# Patient Record
Sex: FEMALE | HISPANIC OR LATINO | Employment: FULL TIME | URBAN - METROPOLITAN AREA
[De-identification: names, ages, dates, MRNs, and addresses within clinical notes are randomized per-mention and may not be internally consistent; named-entity substitution may affect disease eponyms.]

---

## 2021-10-26 ENCOUNTER — LAB REQUISITION (OUTPATIENT)
Dept: LAB | Facility: CLINIC | Age: 26
End: 2021-10-26
Payer: COMMERCIAL

## 2021-10-26 DIAGNOSIS — L08.9 LOCAL INFECTION OF THE SKIN AND SUBCUTANEOUS TISSUE, UNSPECIFIED: ICD-10-CM

## 2021-10-26 PROCEDURE — 87077 CULTURE AEROBIC IDENTIFY: CPT | Mod: ORL | Performed by: DERMATOLOGY

## 2021-10-30 LAB — BACTERIA SPEC CULT: ABNORMAL

## 2024-02-19 ENCOUNTER — VIRTUAL VISIT (OUTPATIENT)
Dept: FAMILY MEDICINE | Facility: CLINIC | Age: 29
End: 2024-02-19
Payer: COMMERCIAL

## 2024-02-19 DIAGNOSIS — E55.9 VITAMIN D DEFICIENCY: ICD-10-CM

## 2024-02-19 DIAGNOSIS — R42 DIZZINESS: Primary | ICD-10-CM

## 2024-02-19 DIAGNOSIS — R53.83 OTHER FATIGUE: ICD-10-CM

## 2024-02-19 PROCEDURE — 99203 OFFICE O/P NEW LOW 30 MIN: CPT | Mod: 95 | Performed by: INTERNAL MEDICINE

## 2024-02-19 NOTE — PROGRESS NOTES
Eleanor is a 28 year old who is being evaluated via a billable video visit.      How would you like to obtain your AVS? MyChart  If the video visit is dropped, the invitation should be resent by: Text to cell phone: [phone]  Will anyone else be joining your video visit? No      Assessment & Plan     Dizziness  Will check labs to rule out iron def, anemia, electrolytes, kidney and liver function.  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   Feeling tired after workout - more winded with HIT.    Faint after sports as a child  Blood work done in [place] which was normal.     No medical conditions except skin condition - acne/rash   Fam hx: neg for B/O/C cancer, unknown for CAD and VTE  DM2 in paternal uncle.  HLD in father   Nonsmoker   ETOH: 1 drink per week.    UTD with pap      History of Present Illness       Hypothyroidism:     Since last visit, patient describes the following symptoms::  Anxiety, Fatigue and Hair loss    Reason for visit:  I get really tired whenever I do high intensity exercise or am physical at all. I get esaily winded and dizzy. I have a history of issues with my blood sugar and thyroid issues run in my family.    She eats 0-1 servings of fruits and vegetables daily.She consumes 0 sweetened beverage(s) daily.She exercises with enough effort to increase her heart rate 9 or less minutes per day.  She exercises with enough effort to increase her heart rate 3 or less  days per week.   She is taking medications regularly.         Objective           Vitals:  No vitals were obtained today due to virtual visit.    Physical Exam   GEN: No acute distress  RESP: No audible increased work of breathing. Patient speaking in full sentences without distress.  PSYCH: pleasant  Exam otherwise limited due to virtual platform        Video-Visit Details    Type of service:  Video Visit     Originating Location (pt. Location): Home  Distant Location (provider location):  On-site  Platform used for Video Visit: Dontae  Signed Electronically by: Bernie Torres MD

## 2024-02-21 ENCOUNTER — MYC MEDICAL ADVICE (OUTPATIENT)
Dept: FAMILY MEDICINE | Facility: CLINIC | Age: 29
End: 2024-02-21
Payer: COMMERCIAL

## 2024-02-21 DIAGNOSIS — Z00.00 ANNUAL PHYSICAL EXAM: Primary | ICD-10-CM

## 2024-02-22 PROBLEM — R42 DIZZINESS: Status: ACTIVE | Noted: 2024-02-22

## 2024-02-22 PROBLEM — E55.9 VITAMIN D DEFICIENCY: Status: ACTIVE | Noted: 2024-02-22

## 2024-02-22 PROBLEM — R53.83 OTHER FATIGUE: Status: ACTIVE | Noted: 2024-02-22

## 2024-02-24 ENCOUNTER — LAB (OUTPATIENT)
Dept: LAB | Facility: CLINIC | Age: 29
End: 2024-02-24
Payer: COMMERCIAL

## 2024-02-24 DIAGNOSIS — Z00.00 ANNUAL PHYSICAL EXAM: ICD-10-CM

## 2024-02-24 DIAGNOSIS — E55.9 VITAMIN D DEFICIENCY: ICD-10-CM

## 2024-02-24 DIAGNOSIS — R42 DIZZINESS: ICD-10-CM

## 2024-02-24 DIAGNOSIS — R53.83 OTHER FATIGUE: ICD-10-CM

## 2024-02-24 LAB
ALBUMIN SERPL BCG-MCNC: 4.3 G/DL (ref 3.5–5.2)
ALP SERPL-CCNC: 63 U/L (ref 40–150)
ALT SERPL W P-5'-P-CCNC: 8 U/L (ref 0–50)
ANION GAP SERPL CALCULATED.3IONS-SCNC: 9 MMOL/L (ref 7–15)
AST SERPL W P-5'-P-CCNC: 18 U/L (ref 0–45)
BASOPHILS # BLD AUTO: 0 10E3/UL (ref 0–0.2)
BASOPHILS NFR BLD AUTO: 0 %
BILIRUB SERPL-MCNC: 0.2 MG/DL
BUN SERPL-MCNC: 10.2 MG/DL (ref 6–20)
CALCIUM SERPL-MCNC: 9.4 MG/DL (ref 8.6–10)
CHLORIDE SERPL-SCNC: 105 MMOL/L (ref 98–107)
CHOLEST SERPL-MCNC: 175 MG/DL
CREAT SERPL-MCNC: 0.77 MG/DL (ref 0.51–0.95)
DEPRECATED HCO3 PLAS-SCNC: 24 MMOL/L (ref 22–29)
EGFRCR SERPLBLD CKD-EPI 2021: >90 ML/MIN/1.73M2
EOSINOPHIL # BLD AUTO: 0 10E3/UL (ref 0–0.7)
EOSINOPHIL NFR BLD AUTO: 0 %
ERYTHROCYTE [DISTWIDTH] IN BLOOD BY AUTOMATED COUNT: 15.4 % (ref 10–15)
FASTING STATUS PATIENT QL REPORTED: YES
FERRITIN SERPL-MCNC: 9 NG/ML (ref 6–175)
GLUCOSE SERPL-MCNC: 89 MG/DL (ref 70–99)
HCT VFR BLD AUTO: 34.2 % (ref 35–47)
HDLC SERPL-MCNC: 64 MG/DL
HGB BLD-MCNC: 10.1 G/DL (ref 11.7–15.7)
IMM GRANULOCYTES # BLD: 0 10E3/UL
IMM GRANULOCYTES NFR BLD: 0 %
IRON BINDING CAPACITY (ROCHE): 352 UG/DL (ref 240–430)
IRON SATN MFR SERPL: 7 % (ref 15–46)
IRON SERPL-MCNC: 23 UG/DL (ref 37–145)
LDLC SERPL CALC-MCNC: 102 MG/DL
LYMPHOCYTES # BLD AUTO: 1.8 10E3/UL (ref 0.8–5.3)
LYMPHOCYTES NFR BLD AUTO: 34 %
MCH RBC QN AUTO: 23.5 PG (ref 26.5–33)
MCHC RBC AUTO-ENTMCNC: 29.5 G/DL (ref 31.5–36.5)
MCV RBC AUTO: 80 FL (ref 78–100)
MONOCYTES # BLD AUTO: 0.4 10E3/UL (ref 0–1.3)
MONOCYTES NFR BLD AUTO: 8 %
NEUTROPHILS # BLD AUTO: 3.1 10E3/UL (ref 1.6–8.3)
NEUTROPHILS NFR BLD AUTO: 57 %
NONHDLC SERPL-MCNC: 111 MG/DL
PLATELET # BLD AUTO: 329 10E3/UL (ref 150–450)
POTASSIUM SERPL-SCNC: 4.7 MMOL/L (ref 3.4–5.3)
PROT SERPL-MCNC: 7.4 G/DL (ref 6.4–8.3)
RBC # BLD AUTO: 4.29 10E6/UL (ref 3.8–5.2)
SODIUM SERPL-SCNC: 138 MMOL/L (ref 135–145)
TRIGL SERPL-MCNC: 46 MG/DL
TSH SERPL DL<=0.005 MIU/L-ACNC: 1.36 UIU/ML (ref 0.3–4.2)
VIT D+METAB SERPL-MCNC: 14 NG/ML (ref 20–50)
WBC # BLD AUTO: 5.3 10E3/UL (ref 4–11)

## 2024-02-24 PROCEDURE — 83550 IRON BINDING TEST: CPT

## 2024-02-24 PROCEDURE — 84443 ASSAY THYROID STIM HORMONE: CPT

## 2024-02-24 PROCEDURE — 82728 ASSAY OF FERRITIN: CPT

## 2024-02-24 PROCEDURE — 85025 COMPLETE CBC W/AUTO DIFF WBC: CPT

## 2024-02-24 PROCEDURE — 80053 COMPREHEN METABOLIC PANEL: CPT

## 2024-02-24 PROCEDURE — 36415 COLL VENOUS BLD VENIPUNCTURE: CPT

## 2024-02-24 PROCEDURE — 80061 LIPID PANEL: CPT

## 2024-02-24 PROCEDURE — 82306 VITAMIN D 25 HYDROXY: CPT

## 2024-02-24 PROCEDURE — 83540 ASSAY OF IRON: CPT

## 2024-02-27 ENCOUNTER — MYC MEDICAL ADVICE (OUTPATIENT)
Dept: FAMILY MEDICINE | Facility: CLINIC | Age: 29
End: 2024-02-27
Payer: COMMERCIAL

## 2024-02-27 DIAGNOSIS — D50.8 IRON DEFICIENCY ANEMIA SECONDARY TO INADEQUATE DIETARY IRON INTAKE: ICD-10-CM

## 2024-02-27 DIAGNOSIS — E55.9 VITAMIN D DEFICIENCY: Primary | ICD-10-CM

## 2024-02-27 RX ORDER — HEPARIN SODIUM,PORCINE 10 UNIT/ML
5-20 VIAL (ML) INTRAVENOUS DAILY PRN
OUTPATIENT
Start: 2024-03-05

## 2024-02-27 RX ORDER — ALBUTEROL SULFATE 90 UG/1
1-2 AEROSOL, METERED RESPIRATORY (INHALATION)
Start: 2024-03-05

## 2024-02-27 RX ORDER — HEPARIN SODIUM (PORCINE) LOCK FLUSH IV SOLN 100 UNIT/ML 100 UNIT/ML
5 SOLUTION INTRAVENOUS
OUTPATIENT
Start: 2024-03-05

## 2024-02-27 RX ORDER — ALBUTEROL SULFATE 0.83 MG/ML
2.5 SOLUTION RESPIRATORY (INHALATION)
OUTPATIENT
Start: 2024-03-05

## 2024-02-27 RX ORDER — DIPHENHYDRAMINE HYDROCHLORIDE 50 MG/ML
50 INJECTION INTRAMUSCULAR; INTRAVENOUS
Start: 2024-03-05

## 2024-02-27 RX ORDER — EPINEPHRINE 1 MG/ML
0.3 INJECTION, SOLUTION, CONCENTRATE INTRAVENOUS EVERY 5 MIN PRN
OUTPATIENT
Start: 2024-03-05

## 2024-02-27 RX ORDER — METHYLPREDNISOLONE SODIUM SUCCINATE 125 MG/2ML
125 INJECTION, POWDER, LYOPHILIZED, FOR SOLUTION INTRAMUSCULAR; INTRAVENOUS
Start: 2024-03-05

## 2024-02-27 RX ORDER — MEPERIDINE HYDROCHLORIDE 25 MG/ML
25 INJECTION INTRAMUSCULAR; INTRAVENOUS; SUBCUTANEOUS EVERY 30 MIN PRN
OUTPATIENT
Start: 2024-03-05

## 2024-02-28 NOTE — TELEPHONE ENCOUNTER
Patient Contact    [facility] message unread     Was call answered? No unable to dial out on pt's number - error message call could not be completed     Kiersten MORALES, Triage RN  [facility]

## 2024-03-01 NOTE — TELEPHONE ENCOUNTER
PCP:     Unable to reach patient - number listed on file for patient is not a working number    No alternative number for patient     No C2C on file for patient     Pt did not view [employer] message     Please advise     Kiersten MORALES Triage RN  [facility]

## 2024-03-06 NOTE — TELEPHONE ENCOUNTER
Patient agrees to vitamin D supplementation.  Please review pended order to ensure it's appropriate

## 2024-03-09 ENCOUNTER — HEALTH MAINTENANCE LETTER (OUTPATIENT)
Age: 29
End: 2024-03-09

## 2024-03-12 NOTE — TELEPHONE ENCOUNTER
Request forwarded over to Infusion team to assist with prior authorization.    Dani Valadez, YISEL on 3/12/2024 at 9:12 AM

## 2024-03-19 ENCOUNTER — VIRTUAL VISIT (OUTPATIENT)
Dept: FAMILY MEDICINE | Facility: CLINIC | Age: 29
End: 2024-03-19
Payer: COMMERCIAL

## 2024-03-19 DIAGNOSIS — D50.8 IRON DEFICIENCY ANEMIA SECONDARY TO INADEQUATE DIETARY IRON INTAKE: Primary | ICD-10-CM

## 2024-03-19 PROCEDURE — 99213 OFFICE O/P EST LOW 20 MIN: CPT | Mod: 95 | Performed by: INTERNAL MEDICINE

## 2024-03-19 RX ORDER — FERROUS SULFATE 325(65) MG
325 TABLET ORAL
Qty: 90 TABLET | Refills: 0 | Status: SHIPPED | OUTPATIENT
Start: 2024-03-19 | End: 2024-06-17

## 2024-03-19 NOTE — PROGRESS NOTES
Eleanor is a 28 year old who is being evaluated via a billable video visit.    How would you like to obtain your AVS? MyChart  If the video visit is dropped, the invitation should be resent by: Text to cell phone: [phone]  Will anyone else be joining your video visit? No    Assessment & Plan     Iron deficiency anemia secondary to inadequate dietary iron intake  Iron sulfate daily with vitamin C   Will check hgb, ferritin and iron in 2 months.  - Ferritin; Future  - ferrous sulfate (FEROSUL) 325 (65 Fe) MG tablet; Take 1 tablet (325 mg) by mouth daily (with breakfast)  - Hemoglobin; Future  - Iron and iron binding capacity; Future        See Patient Instructions    Subjective   Eleanor is a 28 year old, presenting for the following health issues:  Recheck Medication    HPI   Eleanor is here virtually to discuss iron deficiency.   Questions about staining - explained.  Wants to do through diet and iron supplement           Objective       Vitals:  No vitals were obtained today due to virtual visit.    Physical Exam   GEN: No acute distress  RESP: No audible increased work of breathing. Patient speaking in full sentences without distress.  PSYCH: pleasant  Exam otherwise limited due to virtual platform        Video-Visit Details    Type of service:  Video Visit   Originating Location (pt. Location): Home  Distant Location (provider location):  On-site  Platform used for Video Visit: Dontae  Signed Electronically by: Bernie Torres MD

## 2024-03-19 NOTE — PROGRESS NOTES
"Eleanor is a 28 year old who is being evaluated via a billable video visit.    How would you like to obtain your AVS? MyChart  If the video visit is dropped, the invitation should be resent by: Send to e-mail at: [email or web address]  Will anyone else be joining your video visit? No  {If patient encounters technical issues they should call [phone] :140639}    {PROVIDER CHARTING PREFERENCE:294812}    Subjective   Eleanor is a 28 year old, presenting for the following health issues:  Recheck Medication    History of Present Illness       Reason for visit:  Staph Flare Up & Questions  Symptom onset:  3-4 weeks ago        {SUPERLIST (Optional):526737}  {additonal problems for provider to add (Optional):700698}    {ROS Picklists (Optional):544059}      Objective           Vitals:  No vitals were obtained today due to virtual visit.    Physical Exam   {video visit exam brief selected:134516}    {Diagnostic Test Results (Optional):466798}      Video-Visit Details    Type of service:  Video Visit   Originating Location (pt. Location): {video visit patient location:759784::\"Home\"}  {PROVIDER LOCATION On-site should be selected for visits conducted from your clinic location or adjoining [facility] hospital, academic office, or other nearby [facility] building. Off-site should be selected for all other provider locations, including home:332583}  Distant Location (provider location):  {virtual location provider:783586}  Platform used for Video Visit: {Virtual Visit Platforms:390070::\"[employer]\"}  Signed Electronically by: Bernie Torres MD  {Email feedback regarding this note to [email or web address]   :631509}  "

## 2024-03-19 NOTE — PROGRESS NOTES
"Eleanor is a 28 year old who is being evaluated via a billable video visit.    {ROOMING STAFF complete during rooming of virtual visit (Optional):459549}  {If patient encounters technical issues they should call [phone] :865361}    {PROVIDER CHARTING PREFERENCE:612239}    Subjective   Eleanor is a 28 year old, presenting for the following health issues:  Recheck Medication  {(!) Visit Details have not yet been documented.  Please enter Visit Details and then use this list to pull in documentation. (Optional):812730}  HPI     {SUPERLIST (Optional):369011}  {additonal problems for provider to add (Optional):023872}    {ROS Picklists (Optional):499611}      Objective           Vitals:  No vitals were obtained today due to virtual visit.    Physical Exam   {video visit exam brief selected:357396}    {Diagnostic Test Results (Optional):346638}      Video-Visit Details    Type of service:  Video Visit   Originating Location (pt. Location): {video visit patient location:835495::\"Home\"}  {PROVIDER LOCATION On-site should be selected for visits conducted from your clinic location or adjoining [facility] hospital, academic office, or other nearby [facility] building. Off-site should be selected for all other provider locations, including home:300895}  Distant Location (provider location):  {virtual location provider:183544}  Platform used for Video Visit: {Virtual Visit Platforms:746531::\"[employer]\"}  Signed Electronically by: Bernie Torres MD  {Email feedback regarding this note to [email or web address]   :606639}  "

## 2024-05-01 ENCOUNTER — TRANSFERRED RECORDS (OUTPATIENT)
Dept: MULTI SPECIALTY CLINIC | Facility: CLINIC | Age: 29
End: 2024-05-01

## 2024-05-01 LAB — PAP SMEAR - HIM PATIENT REPORTED: NORMAL

## 2024-05-21 DIAGNOSIS — E55.9 VITAMIN D DEFICIENCY: ICD-10-CM

## 2024-05-21 RX ORDER — CHOLECALCIFEROL (VITAMIN D3) 1250 MCG
1250 CAPSULE ORAL
Qty: 12 CAPSULE | Refills: 0 | OUTPATIENT
Start: 2024-05-21

## 2024-05-22 NOTE — TELEPHONE ENCOUNTER
Attempted to contact pt. Number is not going through when called. No c2c on file for spouse.     Sent [facility] message with providers message from below. Will postpone to ensure pt has read the [facility] message.       Patient Contact    Attempt # 1    Was call answered?  No.  Unable to leave message.

## 2024-06-16 DIAGNOSIS — D50.8 IRON DEFICIENCY ANEMIA SECONDARY TO INADEQUATE DIETARY IRON INTAKE: ICD-10-CM

## 2024-06-17 RX ORDER — FERROUS SULFATE 325(65) MG
325 TABLET ORAL
Qty: 90 TABLET | Refills: 0 | Status: SHIPPED | OUTPATIENT
Start: 2024-06-17 | End: 2024-08-21

## 2024-06-17 NOTE — TELEPHONE ENCOUNTER
Prescription approved per [facility].  Fariba Chamberlain RN  [facility]

## 2024-08-21 ENCOUNTER — MYC MEDICAL ADVICE (OUTPATIENT)
Dept: FAMILY MEDICINE | Facility: CLINIC | Age: 29
End: 2024-08-21

## 2024-08-21 ENCOUNTER — VIRTUAL VISIT (OUTPATIENT)
Dept: FAMILY MEDICINE | Facility: CLINIC | Age: 29
End: 2024-08-21
Payer: COMMERCIAL

## 2024-08-21 DIAGNOSIS — F98.8 ATTENTION DEFICIT DISORDER, UNSPECIFIED HYPERACTIVITY PRESENCE: ICD-10-CM

## 2024-08-21 DIAGNOSIS — D50.8 IRON DEFICIENCY ANEMIA SECONDARY TO INADEQUATE DIETARY IRON INTAKE: ICD-10-CM

## 2024-08-21 DIAGNOSIS — Z83.3 FAMILY HISTORY OF DIABETES MELLITUS: ICD-10-CM

## 2024-08-21 DIAGNOSIS — M26.622 ARTHRALGIA OF LEFT TEMPOROMANDIBULAR JOINT: ICD-10-CM

## 2024-08-21 DIAGNOSIS — E55.9 VITAMIN D DEFICIENCY: Primary | ICD-10-CM

## 2024-08-21 PROCEDURE — 99213 OFFICE O/P EST LOW 20 MIN: CPT | Mod: 95 | Performed by: INTERNAL MEDICINE

## 2024-08-21 RX ORDER — SPIRONOLACTONE 100 MG/1
100 TABLET, FILM COATED ORAL DAILY
COMMUNITY

## 2024-08-21 NOTE — PATIENT INSTRUCTIONS
[facility]  [address]  [facility] [address]  Phone: [phone]

## 2024-08-21 NOTE — PROGRESS NOTES
Eleanor is a 28 year old who is being evaluated via a billable video visit.    How would you like to obtain your AVS? MyChart  If the video visit is dropped, the invitation should be resent by: Text to cell phone: [phone] ???   Will anyone else be joining your video visit? No      Assessment & Plan     Vitamin D deficiency  - Vitamin D Deficiency; Future    Family history of diabetes mellitus  - Hemoglobin A1c; Future    Iron deficiency anemia secondary to inadequate dietary iron intake  - Ferritin; Future  - Hemoglobin; Future    Arthralgia of left temporomandibular joint  - Pain Management  Referral; Future    Attention deficit disorder, unspecified hyperactivity presence  - Adult Mental Health  Referral; Future      See Patient Instructions    Subjective   Eleanor is a 28 year old, presenting for the following health issues:  Mouth Problem (TMJ pain ) and Medication Follow-up (Iron )      Via the Health Maintenance questionnaire, the patient has reported the following services have been completed -Cervical Cancer Screening: Rich WHALEY 2024-06-01, this information has been sent to the abstraction team.  History of Present Illness       Reason for visit:  Jaw Pain is becoming unbearable & ADHD meds questions  Symptom onset:  More than a month  Symptoms include:  Daily, persistent and bad  Symptom intensity:  Severe  Symptom progression:  Worsening  Had these symptoms before:  Yes  Has tried/received treatment for these symptoms:  No  What makes it worse:  Using my jaw  What makes it better:  Not yet.. haven't found a solution She is missing 1 dose(s) of medications per week.  She is not taking prescribed medications regularly due to remembering to take.     Eleanor Brandi Hodges is here virtually to discuss concerns.   She has hx of iron deficiency anemia, was treated with iron sulfate oral. Hemoglobin is normal, checked at [facility].  Vit D high dose supp completed.   TMJ pain, she has  been to her dentist, getting a mouthguard.         Objective       Vitals:  No vitals were obtained today due to virtual visit.    Physical Exam   GEN: No acute distress  RESP: No audible increased work of breathing. Patient speaking in full sentences without distress.  PSYCH: pleasant  Exam otherwise limited due to virtual platform        Video-Visit Details    Type of service:  Video Visit   Originating Location (pt. Location): Home  Distant Location (provider location):  On-site  Platform used for Video Visit: [facility]  Signed Electronically by: Bernie Torres MD

## 2025-02-12 ENCOUNTER — VIRTUAL VISIT (OUTPATIENT)
Facility: CLINIC | Age: 30
End: 2025-02-12
Attending: INTERNAL MEDICINE
Payer: COMMERCIAL

## 2025-02-12 DIAGNOSIS — Z13.39 ATTENTION DEFICIT HYPERACTIVITY DISORDER (ADHD) EVALUATION: ICD-10-CM

## 2025-02-12 DIAGNOSIS — F41.1 GENERALIZED ANXIETY DISORDER: Primary | ICD-10-CM

## 2025-02-12 PROCEDURE — 90832 PSYTX W PT 30 MINUTES: CPT | Mod: 95 | Performed by: PSYCHOLOGIST

## 2025-02-12 ASSESSMENT — COLUMBIA-SUICIDE SEVERITY RATING SCALE - C-SSRS
TOTAL  NUMBER OF INTERRUPTED ATTEMPTS LIFETIME: NO
6. HAVE YOU EVER DONE ANYTHING, STARTED TO DO ANYTHING, OR PREPARED TO DO ANYTHING TO END YOUR LIFE?: NO
TOTAL  NUMBER OF ABORTED OR SELF INTERRUPTED ATTEMPTS LIFETIME: NO
2. HAVE YOU ACTUALLY HAD ANY THOUGHTS OF KILLING YOURSELF?: NO
1. HAVE YOU WISHED YOU WERE DEAD OR WISHED YOU COULD GO TO SLEEP AND NOT WAKE UP?: NO
ATTEMPT LIFETIME: NO

## 2025-02-12 ASSESSMENT — PATIENT HEALTH QUESTIONNAIRE - PHQ9
SUM OF ALL RESPONSES TO PHQ QUESTIONS 1-9: 5
5. POOR APPETITE OR OVEREATING: SEVERAL DAYS
10. IF YOU CHECKED OFF ANY PROBLEMS, HOW DIFFICULT HAVE THESE PROBLEMS MADE IT FOR YOU TO DO YOUR WORK, TAKE CARE OF THINGS AT HOME, OR GET ALONG WITH OTHER PEOPLE: SOMEWHAT DIFFICULT
SUM OF ALL RESPONSES TO PHQ QUESTIONS 1-9: 5

## 2025-02-12 ASSESSMENT — ANXIETY QUESTIONNAIRES
2. NOT BEING ABLE TO STOP OR CONTROL WORRYING: SEVERAL DAYS
6. BECOMING EASILY ANNOYED OR IRRITABLE: SEVERAL DAYS
5. BEING SO RESTLESS THAT IT IS HARD TO SIT STILL: NOT AT ALL
GAD7 TOTAL SCORE: 7
7. FEELING AFRAID AS IF SOMETHING AWFUL MIGHT HAPPEN: SEVERAL DAYS
3. WORRYING TOO MUCH ABOUT DIFFERENT THINGS: MORE THAN HALF THE DAYS
GAD7 TOTAL SCORE: 7
1. FEELING NERVOUS, ANXIOUS, OR ON EDGE: SEVERAL DAYS

## 2025-02-12 NOTE — PROGRESS NOTES
[facility]   Mental Health & Addiction Services     Progress Note - Initial Visit    Client Name:  Eleanor Hodges Date: 2025         Service Type: Individual     Visit Start Time: 11:00am  Visit End Time: 11:32am    Visit #: 1    Attendees: Client attended alone    Service Modality:  Video Visit:      Provider verified identity through the following two step process.  Patient provided:  Patient  and Patient address    Telemedicine Visit: The patient's condition can be safely assessed and treated via synchronous audio and visual telemedicine encounter.      Reason for Telemedicine Visit: Services only offered telehealth    Originating Site (Patient Location): Patient's home    Distant Site (Provider Location): Provider Remote Setting- Home Office    Consent:  The patient/guardian has verbally consented to: the potential risks and benefits of telemedicine (video visit) versus in person care; bill my insurance or make self-payment for services provided; and responsibility for payment of non-covered services.     Patient would like the video invitation sent by:  Send to e-mail at: [email or web address]    Mode of Communication:  Video Conference via [employer]    Distant Location (Provider):  Off-site    As the provider I attest to compliance with applicable laws and regulations related to telemedicine.       DATA:  Extended Session (53+ minutes): No  Interactive Complexity: No   Crisis: No     Presenting Concerns/Current Stressors:   Patient presented to session to initiate the ADHD evaluation process.           2025    10:16 AM   PHQ   PHQ-9 Total Score 5    Q9: Thoughts of better off dead/self-harm past 2 weeks Not at all       Patient-reported            2025    10:16 AM   RIANNA-7 SCORE   Total Score 7       ASSESSMENT:  Mental Status Assessment:  Appearance:   Appropriate   Eye Contact:   Good   Psychomotor Behavior: Normal   Attitude:   Cooperative    Orientation:   All  Speech   Rate / Production: Normal/ Responsive   Volume:  Normal   Mood:    Normal  Affect:    Appropriate   Thought Content:  Clear   Thought Form:  Logical   Insight:    Good       Safety Issues and Plan for Safety and Risk Management:   [place] Suicide Severity Rating Scale (Lifetime/Recent)      2/12/2025    11:06 AM   [place] Suicide Severity Rating (Lifetime/Recent)   1. Wish to be Dead (Lifetime) N   2. Non-Specific Active Suicidal Thoughts (Lifetime) N   Actual Attempt (Lifetime) N   Has subject engaged in non-suicidal self-injurious behavior? (Lifetime) N   Interrupted Attempts (Lifetime) N   Aborted or Self-Interrupted Attempt (Lifetime) N   Preparatory Acts or Behavior (Lifetime) N   Calculated C-SSRS Risk Score (Lifetime/Recent) No Risk Indicated     Patient denies current fears or concerns for personal safety.  Patient denies current or recent suicidal ideation or behaviors.  Patient denies current or recent homicidal ideation or behaviors.  Patient denies current or recent self injurious behavior or ideation.  Patient denies other safety concerns.  Recommended that patient call 911 or go to the local ED should there be a change in any of these risk factors   Patient reports there are no firearms in the house.    Diagnostic Criteria:  F41.1:  A. Excessive anxiety and worry, occurring more days than not for at least 6 months about a number of events or activities.   B. The individual finds it difficult to control the worry.  C. The anxiety and worry are associated with 3 or more of 6 symptoms.  D. The anxiety, worry, or physical symptoms cause clinically significant distress or impairment in social, occupational, or other important areas of functioning.  E. The disturbance is not attributable to the physiological effects of a substance (e.g., a drug of abuse, a medication) or another medical condition (e.g., hyperthyroidism).  F. The disturbance is not better explained by another  mental disorder (e.g., anxiety or worry about having panic attacks in panic disorder, negative evaluation in social anxiety disorder [social phobia], contamination or other obsessions in obsessive-compulsive disorder, separation from attachment figures in separation anxiety disorder, reminders of traumatic events in posttraumatic stress disorder, gaining weight in anorexia nervosa, physical complaints in somatic symptom disorder, perceived appearance flaws in body dysmorphic disorder, having a serious illness in illness anxiety disorder, or the content of delusional beliefs in schizophrenia or delusional disorder).      DSM5 Diagnoses: (Sustained by DSM5 Criteria Listed Above)  Diagnoses:   1. Generalized anxiety disorder    2. Attention deficit hyperactivity disorder (ADHD) evaluation      Psychosocial & Contextual Factors: social support, employed    PROMIS-10 Scores  Global Mental Health Score: (Patient-Rptd) (P) 15  Global Physical Health Score: (Patient-Rptd) (P) 16   PROMIS TOTAL - SUBSCORES: (Patient-Rptd) (P) 31      Intervention:              Reviewed symptoms and history of presenting concern. Patient endorsed symptoms consistent with anxiety  and ADHD. Patient denied symptoms associated with depression , sharon, panic, OCD, trauma, and perceptual difficulties. Unable to complete diagnostic intake, will be completed in next session.    CBT: socratic questioning, positive reinforcement  EFT: empathetic attunement, emotion checking, emotion naming  MI: open ended questions, affirmations, reflections        Attendance Agreement:  Client has not signed the attendance agreement. Discussed expectations at beginning of this first session and patient agreed.       PLAN:  Provider will continue Diagnostic Assessment in next session. Patient will complete Huy questionnaires  and CNS Vital Signs prior to next session (2/19/2025).    Patient meets the following risk assessment and triage: Patient denied any  current/recent/lifetime history of suicidal ideation and/or behaviors.  No safety plan indicated at this time.     Medical necessity criteria is warranted in order to: Measure a psychological disorder and its severity and functional impairment to determine psychiatric diagnosis when a mental illness is suspected, or to achieve a differential diagnosis from a range of medical/psychological disorders that present with similar constellations of symptoms (e.g., determination and measurement of anxiety severity and impact in the presence of ongoing asthma or heart disease), Perform symptom measurement to objectively measure treatment effectiveness and/or determine the need to refer for pharmacological treatment or other medical evaluation (e.g., based on severity and chronicity of symptoms), and Evaluate primary symptoms of impaired attention and concentration that can occur in many neurological and psychiatric conditions.    Medical necessity for psychological assessment is warranted as a result of the following: (1) A specific clinical question is posed that relates to the condition/symptoms being addressed (2) The question cannot be adequately addressed by clinical interview and/or behavioral observation (3) Results of psychological testing are reasonably expected to provide an answer to the query (4) It is reasonably expected that the testing will provide information leading to a clearer diagnosis and/or guide treatment planning with an expectation of improved clinical outcome.    I acknowledge that, based upon current clinical information, the patient and I have reviewed and discussed issues pertaining to the purpose of therapy/testing, potential therapeutic goals, procedures, risks and benefits, and estimated duration of therapy/testing. Issues pertaining to fees/insurance and confidentiality were also addressed with the patient, who indicated understanding and elected to continue with appointments. I will not be  providing any experimental procedures and, if we agree that a change in clinical procedure would be more beneficial, I will obtain specific consent for that procedure or refer you to another provider who has expertise in that area.       Dilma Mcallister PsyD,   Clinical Psychologist

## 2025-02-19 ENCOUNTER — VIRTUAL VISIT (OUTPATIENT)
Facility: CLINIC | Age: 30
End: 2025-02-19
Payer: COMMERCIAL

## 2025-02-19 DIAGNOSIS — Z13.39 ATTENTION DEFICIT HYPERACTIVITY DISORDER (ADHD) EVALUATION: ICD-10-CM

## 2025-02-19 DIAGNOSIS — F41.1 GENERALIZED ANXIETY DISORDER: Primary | ICD-10-CM

## 2025-02-19 PROCEDURE — 90791 PSYCH DIAGNOSTIC EVALUATION: CPT | Mod: 95 | Performed by: PSYCHOLOGIST

## 2025-02-19 ASSESSMENT — ANXIETY QUESTIONNAIRES
3. WORRYING TOO MUCH ABOUT DIFFERENT THINGS: MORE THAN HALF THE DAYS
GAD7 TOTAL SCORE: 8
IF YOU CHECKED OFF ANY PROBLEMS ON THIS QUESTIONNAIRE, HOW DIFFICULT HAVE THESE PROBLEMS MADE IT FOR YOU TO DO YOUR WORK, TAKE CARE OF THINGS AT HOME, OR GET ALONG WITH OTHER PEOPLE: SOMEWHAT DIFFICULT
GAD7 TOTAL SCORE: 8
7. FEELING AFRAID AS IF SOMETHING AWFUL MIGHT HAPPEN: SEVERAL DAYS
2. NOT BEING ABLE TO STOP OR CONTROL WORRYING: MORE THAN HALF THE DAYS
GAD7 TOTAL SCORE: 8
1. FEELING NERVOUS, ANXIOUS, OR ON EDGE: SEVERAL DAYS
8. IF YOU CHECKED OFF ANY PROBLEMS, HOW DIFFICULT HAVE THESE MADE IT FOR YOU TO DO YOUR WORK, TAKE CARE OF THINGS AT HOME, OR GET ALONG WITH OTHER PEOPLE?: SOMEWHAT DIFFICULT
5. BEING SO RESTLESS THAT IT IS HARD TO SIT STILL: NOT AT ALL
7. FEELING AFRAID AS IF SOMETHING AWFUL MIGHT HAPPEN: SEVERAL DAYS
6. BECOMING EASILY ANNOYED OR IRRITABLE: SEVERAL DAYS
4. TROUBLE RELAXING: SEVERAL DAYS

## 2025-02-19 NOTE — PROGRESS NOTES
[facility]  Provider Name:  Dilma Mcallister     Credentials:  ARMAND Victoria    PATIENT'S NAME: Eleanor Hodges  PREFERRED NAME: Eleanor  PRONOUNS: she/her  MRN: [id number]  : 1995  ADDRESS: [address] [address]  ACCT. NUMBER:  [id number]  DATE OF SERVICE: 25  START TIME: 5:00pm  END TIME: 5:35pm  PREFERRED PHONE: [phone]  SERVICE MODALITY:  Video Visit:      Provider verified identity through the following two step process.  Patient provided:  Patient  and Patient address    Telemedicine Visit: The patient's condition can be safely assessed and treated via synchronous audio and visual telemedicine encounter.      Reason for Telemedicine Visit: Patient convenience (e.g. access to timely appointments / distance to available provider)    Originating Site (Patient Location): Patient's home    Distant Site (Provider Location): Provider Remote Setting- Home Office    Consent:  The patient/guardian has verbally consented to: the potential risks and benefits of telemedicine (video visit) versus in person care; bill my insurance or make self-payment for services provided; and responsibility for payment of non-covered services.     Patient would like the video invitation sent by:  Send to e-mail at: [email or web address]    Mode of Communication:  Video Conference via [employer]    Distant Location (Provider):  Off-site    As the provider I attest to compliance with applicable laws and regulations related to telemedicine.    UNIVERSAL ADULT Mental Health DIAGNOSTIC ASSESSMENT    Identifying Information:  Patient is a 29 year old,  woman. The pronoun use throughout this assessment reflects the patient's chosen pronoun. Patient was referred for an assessment by Bernie Torres MD. Patient attended the session alone.     Chief Complaint:   Patient reported seeking services at this time for diagnostic assessment and recommendations for treatment. Patient's  presenting concerns include: Procrastination, difficulties with time management, and feeling spacey.  The patient indicated that these issues have been affecting her confidence. Specifically, the patient reported experiencing the following symptoms: making careless mistakes/problems attending to details, difficulty sustaining attention, not following through with tasks, difficulty organizing, avoiding tasks that require sustained mental effort, being easily distracted, and being forgetful.     Patient reported that she has not been assessed for ADHD in the past. Symptoms reportedly began in childhood. The patient reported a history of anxiety symptoms that have been present for a number of years, but have worsened over the course of the past year.  The patient described problems with rumination and feeling overwhelmed with pressure.  She stated that she was diagnosed with depression about 10 years ago and her current therapist believes the symptoms are mild.  The patient stated that she has insecurity regarding her own capabilities and the is manifest as depression-like symptoms. Client reported that other professional(s) are involved in providing services, as she was referred by her PCP.    Social/Family History:  Patient reported she grew up in  [place] . Patient was the first born of 2 children. There are no known complications during pregnancy or delivery. This is an intact family and parents remain . Patient reported no difficulty with childhood peer relationships.  The patient described that she lived in the same house and attended the same school all through childhood.  Indicated that her parents were very sheltering.  She explained that her father has distanced himself from her because he is very Buddhism and she has not.  She stated that she does talk with her mom often but needs boundaries in this relationship.    The patient denied a history of learning disorders, special education  programming, and receiving tutoring services. Patient denied a history of head injuries. As a child, she reported sometimes being disengaged but ultimately being able to concentrate when she wanted to.  However, homework was consistently procrastinated.  Was often losing things and forgetting her backpack at home.  Attended the same private school elementary through high school.  She had opportunities to participate in a variety of extracurricular activities and clubs.  After high school, began attending college at the [employer].  She reported having poor time management, procrastinating coursework, and feeling insecure about her own difficulties.  She stated that for the most part, she was attending lectures, but would occasionally run late.  Did also occasionally skip lectures in order to work on coursework for another class that day.  After college, worked for a year before coming to [place] and completing her masters degree.  She stated that coursework was slightly easier because she had already been exposed to much of it, though she had continued problems with procrastination.    The patient describes her cultural background as Italian.  Cultural influences and impact on patient's life structure, values, norms, and healthcare: Cultural Bias (mental health not as recognized) . Patient identified her preferred language to be English. Patient reported she does not need the assistance of an  or other support involved in therapy.     Patient is currently  to her  of 4 years.  The patient stated that her  has made comments about her difficulties completing her chores. Patient identified their sexual orientation as heterosexual. Patient reported having zero child(halina). Patient identified partner, pets, and friends as part of her support system. Patient identified the quality of these relationships as fair.      Patient is staying in own home/apartment. Patient  lives with her . Housing is stable.     Patient is currently  employed full-time as an .  She stated that she is able to break tasks down into their component parts and accomplish them that way.  However, when there is a lot of discussion and task assignment happening at the same time, she has difficulties engaging and soaking in material.  She has been procrastinating licensing exams for the past 5 years, as she is starting to study for those currently.  She also has a side business and reported problems with getting and over her head last year and not delivering/being behind on deadlines . Patient reports finances are obtained through employment.     Patient has not served in the .     Patient reported that she has not been involved with the legal system. Patient denies being on probation / parole / under the jurisdiction of the court.    Patient has received a 's license. Patient reported that some individuals have expressed not liking riding in the car with her.  Her  has reportedly stated that she brakes too hard.  Has not been involved in any accidents.    Patient's Strengths and Limitations:  Patient identified the following strengths or resources that will help them succeed in treatment: friends / good social support, family support, insight, intelligence, positive work environment, motivation, strong social skills, and work ethic. Things that may interfere with the patient's success in treatment include: none identified.     Personal and Family Medical History:  Patient reported no family history of mental health issues.  Patient previously received the following mental health diagnosis: an Anxiety Disorder and Depression.   Patient has received the following mental health services in the past: counseling.   Patient is currently receiving the following services: counseling.  Hospitalizations: None.   Previous/Current commitments: None.     Patient has had a physical exam  to rule out medical causes for current symptoms. Date of last physical exam was 8/21/2024. The patient's PCP is Bernie Torres MD. Patient reported no current medical concerns. Patient denies any issues with pain.. There are not significant appetite/nutritional concerns/weight changes.    Current Outpatient Medications   Medication Sig Dispense Refill    spironolactone (ALDACTONE) 100 MG tablet Take 100 mg by mouth daily. 100 mg in am, 50 mg in pm       No current facility-administered medications for this visit.       N/A - Client does not have prescribed psychiatric medications.    Patient Allergies: None known.    Medical History:   Patient Active Problem List   Diagnosis    Dizziness    Other fatigue    Vitamin D deficiency    Iron deficiency anemia secondary to inadequate dietary iron intake    Family history of diabetes mellitus    Arthralgia of left temporomandibular joint    Attention deficit disorder     Family history includes: Excessive alcohol use in paternal grandfather and maternal grandfather.    Current Mental Status Exam:   Appearance:  Appropriate    Eye Contact:  Good   Psychomotor:  Normal       Gait / station:  no problem  Attitude / Demeanor: Cooperative   Speech      Rate / Production: Normal/ Responsive      Volume:  Normal  volume      Language:  intact  Mood:   Euthymic  Affect:   Appropriate    Thought Content: Clear   Thought Process: Logical       Associations: No loosening of associations  Insight:   Good   Judgment:  Intact   Orientation:  All  Attention/concentration: Good    Rating Scales:  PHQ9:        2/12/2025    10:16 AM   PHQ-9 SCORE   PHQ-9 Total Score MyChart 5 (Mild depression)   PHQ-9 Total Score 5        Patient-reported       GAD7:        2/12/2025    10:16 AM 2/19/2025     4:57 PM   RIANNA-7 SCORE   Total Score  8 (mild anxiety)   Total Score 7 8        Patient-reported       Substance Use:  Patient did report a family history of substance use concerns; see medical history  section for details. Patient has not received chemical dependency treatment in the past. Patient has not ever been to detox. Patient is not currently receiving any chemical dependency treatment. Patient reported  no  problems as a result of her substance use.    Alcohol: Patient reported first using alcohol at [age] years old, as it is legal in [place] at 18 years old.  She indicated that use was heavy until about [age] years old.  Use then became more social and decreased.  Currently does not drink and has been sober for about 1 year.  Nicotine: None.  Cannabis: Patient reported having THC beverages or Gummies 1 time per week on Fridays.  Use has been at this frequency for the past year, no use prior.  Caffeine: None.  Street Drugs: None.  Prescription Drugs: None.    CAGE: None of the patient's responses to the CAGE screening were positive / Negative CAGE score     Substance Use: No symptoms    Based on the negative CAGE score and clinical interview there are not indications of drug or alcohol abuse.    Significant Losses/Trauma/Abuse/Neglect Issues:   There are indications or report of significant loss, trauma, abuse or neglect issues related to: are no indications and client denies any losses, trauma, abuse, or neglect concerns.  Concerns for possible neglect are not present.    Safety Assessment:   [place] Suicide Severity Rating Scale (Lifetime/Recent)[place] Suicide Severity Rating Scale (Lifetime/Recent)      2/12/2025    11:06 AM   [place] Suicide Severity Rating (Lifetime/Recent)   1. Wish to be Dead (Lifetime) N   2. Non-Specific Active Suicidal Thoughts (Lifetime) N   Actual Attempt (Lifetime) N   Has subject engaged in non-suicidal self-injurious behavior? (Lifetime) N   Interrupted Attempts (Lifetime) N   Aborted or Self-Interrupted Attempt (Lifetime) N   Preparatory Acts or Behavior (Lifetime) N   Calculated C-SSRS Risk Score (Lifetime/Recent) No Risk Indicated     Patient denies current  homicidal ideation and behaviors.  Patient denies current self-injurious ideation and behaviors.    Patient denied risk behaviors associated with substance use.  Patient denies any high risk behaviors associated with mental health symptoms.  Patient reports the following current concerns for their personal safety: None.  Patient reports there are not firearms in the house.     History of Safety Concerns:  Patient denied a history of homicidal ideation.     Patient denied a history of personal safety concerns.    Patient denied a history of assaultive behaviors.    Patient denied a history of sexual assault behaviors.     Patient denied a history of risk behaviors associated with substance use.  Patient denies any history of high risk behaviors associated with mental health symptoms.  Patient reports the following protective factors: other    Risk Plan:  See Recommendations for Safety and Risk Management Plan below.    Review of Patient-Reported Symptoms:  Depression: Lack of interest or pleasure in doing things, Change in energy level, Change in sleep, Low self-worth, and Difficulties concentrating  Lauren:  No Symptoms  Psychosis: No Symptoms  Anxiety: Excessive worry, Nervousness, Ruminations, Poor concentration, and Irritability  Panic:  No symptoms  Post Traumatic Stress Disorder:  No Symptoms   Eating Disorder: No Symptoms  ADD / ADHD:  Inattentive, Poor task completion, Poor organizational skills, Distractibility, and Forgetful  Conduct Disorder: No symptoms  Autism Spectrum Disorder: No observed symptoms. An autism spectrum disorder diagnosis requires specialized assessment.  Obsessive Compulsive Disorder: No Symptoms  Patient reports the following compulsive behaviors and treatment history: None.      Diagnostic Criteria:   F41.1:  A. Excessive anxiety and worry, occurring more days than not for at least 6 months about a number of events or activities.   B. The individual finds it difficult to control the  worry.  C. The anxiety and worry are associated with 3 or more of 6 symptoms.  D. The anxiety, worry, or physical symptoms cause clinically significant distress or impairment in social, occupational, or other important areas of functioning.  E. The disturbance is not attributable to the physiological effects of a substance (e.g., a drug of abuse, a medication) or another medical condition (e.g., hyperthyroidism).  F. The disturbance is not better explained by another mental disorder (e.g., anxiety or worry about having panic attacks in panic disorder, negative evaluation in social anxiety disorder [social phobia], contamination or other obsessions in obsessive-compulsive disorder, separation from attachment figures in separation anxiety disorder, reminders of traumatic events in posttraumatic stress disorder, gaining weight in anorexia nervosa, physical complaints in somatic symptom disorder, perceived appearance flaws in body dysmorphic disorder, having a serious illness in illness anxiety disorder, or the content of delusional beliefs in schizophrenia or delusional disorder).    Functional Status:  Patient reports the following functional impairments: management of the household and or completion of tasks, operation of a motor vehicle, organization, relationship(s), and work / vocational responsibilities.       PROMIS-10:   Global Mental Health Score: (Patient-Rptd) (P) 14  Global Physical Health Score: (Patient-Rptd) (P) 15   PROMIS TOTAL - SUBSCORES: (Patient-Rptd) (P) 29   Nonprogrammatic care: Patient is requesting basic services to address current mental health concerns.    Clinical Summary:  1. Reason for assessment: assessing reported deficits in executive functioning (rule in/out ADHD).  2. Psychosocial, cultural and contextual factors: Social support, employed full-time, has individual psychotherapist.  3. Principal DSM-5 diagnoses (Sustained by DSM5 Criteria Listed Above) and other diagnoses relevant to  this service:   1. Generalized anxiety disorder    2. Attention deficit hyperactivity disorder (ADHD) evaluation      4. Prognosis: Expect Improvement.  5. Likely consequences of symptoms if not treated: Continued difficulties with inattention.  6. Client strengths include: educated, employed, has a previous history of therapy, insightful, intelligent, motivated, support of family, friends and providers, and supportive .     Recommendations:   Feedback session scheduled for 3/12/2025.    1. Plan for Safety and Risk Management:  Safety and Risk: Recommended that patient call 911 or go to the local ED should there be a change in any of these risk factors.         Report to child / adult protection services was NA.     2. Patient's identified mental health concerns with a cultural influence will be addressed in final recommendations.     3. Initial Treatment will focus on: ADHD testing. See above.      4. Resources/Service Plan:    services are not indicated.   Modifications to assist communication are not indicated.   Additional disability accommodations are not indicated.      5. Collaboration:   Collaboration / coordination of treatment will be initiated with the following  support professionals: primary care physician.      6.  Referrals:   The following referral(s) will be initiated:N/A.   A Release of Information has been obtained for the following: N/A.   Emergency Contact was not obtained.    Clinical Substantiation/medical necessity for the above recommendations:     Medical necessity criteria is warranted in order to: Measure a psychological disorder and its severity and functional impairment to determine psychiatric diagnosis when a mental illness is suspected, or to achieve a differential diagnosis from a range of medical/psychological disorders that present with similar constellations of symptoms (e.g., determination and measurement of anxiety severity and impact in the presence of ongoing  asthma or heart disease), Perform symptom measurement to objectively measure treatment effectiveness and/or determine the need to refer for pharmacological treatment or other medical evaluation (e.g., based on severity and chronicity of symptoms), and Evaluate primary symptoms of impaired attention and concentration that can occur in many neurological and psychiatric conditions.    Medical necessity for psychological assessment is warranted as a result of the following: (1) A specific clinical question is posed that relates to the condition/symptoms being addressed (2) The question cannot be adequately addressed by clinical interview and/or behavioral observation (3) Results of psychological testing are reasonably expected to provide an answer to the query (4) It is reasonably expected that the testing will provide information leading to a clearer diagnosis and/or guide treatment planning with an expectation of improved clinical outcome.    7. ADRIANE: N/A.    8. Records:   These were reviewed at time of assessment.   Information in this assessment was obtained from the medical record and  provided by patient who is a good historian. Patient will have open access to their mental health medical record.    9. Interactive Complexity: No     Parts of this documentation may have been completed using dictation software. Potential errors may result and are unintentional.       Dilma Mcallister PsyD, LP  February 19, 2025

## 2025-03-12 ENCOUNTER — VIRTUAL VISIT (OUTPATIENT)
Facility: CLINIC | Age: 30
End: 2025-03-12
Payer: COMMERCIAL

## 2025-03-12 DIAGNOSIS — F41.1 GENERALIZED ANXIETY DISORDER: ICD-10-CM

## 2025-03-12 DIAGNOSIS — F90.0 ATTENTION DEFICIT HYPERACTIVITY DISORDER, INATTENTIVE TYPE, MODERATE: Primary | ICD-10-CM

## 2025-03-12 PROCEDURE — 96131 PSYCL TST EVAL PHYS/QHP EA: CPT | Mod: 95 | Performed by: PSYCHOLOGIST

## 2025-03-12 PROCEDURE — 96130 PSYCL TST EVAL PHYS/QHP 1ST: CPT | Mod: 95 | Performed by: PSYCHOLOGIST

## 2025-03-12 NOTE — Clinical Note
Hello,  I wanted to let you know that I have completed the ADHD assessment with this patient.  As you will see the report, data do support an ADHD diagnosis.  I encouraged her to make an appointment with you soon to discuss medication options.  Please let me know if you have any questions or concerns!  Thanks!   Dilma

## 2025-03-12 NOTE — PROGRESS NOTES
Psychological Assessment Report    Patient: Eleanor Hodges  YOB: 1995  MRN: [id number]  Date(s) of assessment: 2/12/2025 and 2/19/2025  Referral Source: MD [facility] Alfar[facility]  Reason for Referral: assessing reported deficits in executive functioning     IDENTIFYING INFORMATION AND BRIEF HISTORY OF PRESENTING PROBLEM: Eleanor Hodges is a 29-year-old [place] woman who presented to the initial diagnostic intake appointments on 2/12/2025 and 2/19/2025 (see diagnostic intake dated 2/19/2025 for more detailed background information) due to primary concerns with feeling spacey, procrastinating, and difficulties organizing time.  The patient reported that in discussing these problems with her PCP and individual psychotherapist, they both recommended ADHD testing.  Given that problems affect her confidence, the patient is seeking the current evaluation.    As a child, the patient reported that she was sometimes disengaged in school, but other times was able to concentrate effectively.  Homework was reportedly procrastinated, as she did not work on things ahead of time.  Also had problems with losing things, being too talkative in class, having a messy bedroom, and was forgetful.  She stated that she had problems forgetting her backpack at home.  After high school, the patient began attending college at the [employer]  She stated that while completing her bachelor's degree, she had problems with managing her time effectively, procrastinating coursework, and running late to lectures.  She stated that she would occasionally skip classes in an effort to complete coursework for another class later that day.  She worked for a year before coming to [place] to complete her masters degree.  She stated that coursework was slightly easier but she continued to procrastinate it.  She is currently employed full-time as an  and has been in this  position for the past 5 years.  She is just now starting to study to take exams for licensure, as she indicated that she procrastinated this task given that most individuals complete these exams immediately in their career.  She reported problems with following directives when there is a lot of information being discussed in meetings.  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  Currently, the patient reported experiencing the following symptoms:  Making careless mistakes/not paying attention to details (recently accidentally left her luggage open and her dog ate chocolate inside)  Difficulty sustaining attention (needs to break things down into small component parts and write lists in order to focus)  Does not follow through with tasks (difficulties completing tasks for her side business)  Difficulty organizing (physical space messy, problems estimating time)  Avoids/dislikes tasks that require sustained mental effort (procrastinates often)  Is easily distracted (jumping from task to task)  Is forgetful (short-term memory problems, recently left the faucet running for over an hour because she forgot to shut it off)    Mental Health History: The patient reported a history of depression symptoms, indicating that a therapist diagnosed her about 10 years ago.  She stated that at the time, her therapist stated that symptoms were likely mild, as they were more related to her insecurities about her own capabilities.  She reported currently significant anxiety symptoms that have worsened over the course of the past year.  She stated that she has tendencies to ruminate on social situations after they occur, experiences pressure from work, and feels insecure about her tendencies to neglect chores in favor of completing more enjoyable tasks.  She is currently attending individual psychotherapy to help manage the symptoms.  The patient denied a  "history of manic symptoms,  social anxiety, phobic responses, symptoms of obsessive-compulsive disorder, trauma, and perceptual difficulties. The patient denied issues with sexual compulsivity, gambling, and disordered eating.    Developmental History: The patient reported that she believes that she is the product of a full-term pregnancy and there were no complications during her mother's pregnancy or birth. The patient reported that she believes she met all of her developmental milestones on time.  However, she acknowledged being hospitalized often in early childhood, as it was discovered that she was allergic to milk and had croup.  She denied a history of head injuries, learning disorders, and special educational programming.  The patient reported that she grew up with her mother, father, and younger sibling in the home.  Parents remain .  She described childhood as \"very sheltering,\" as she attended a private school K-12th grade.  She denied any particular academic strengths or weaknesses.  Did have opportunities to participate in extracurricular activities and reported forgetfulness in those environments as well.    The patient currently lives with her  of three years.  She stated that he has commented on her problems completing her portion of chores in favor for working on more desirable tasks.    Chemical Dependence/Substance Abuse History: The patient reported heavy alcohol use in late teens/early 20s.  She does not drink alcohol currently.  She reported occasional THC beverages or gummies, about one time per week usually on Fridays.  Use has been occurring at this frequency for the past year.     SOURCES OF DATA/ASSESSMENT: Review of medical and psychiatric records, consideration of behavioral observations during the testing (if applicable), and the results of the psychological tests are all considered in the preparation of this psychological test report. It is important to note that test " results comprise a hypothesis of the patient's mental health concerns and are not an independent or conclusive assessment. Test results are combined with the patient's available medical, psychological, behavioral data for an integrated interpretation and report. Due to virtual/remote administration, certain aspects of the assessment process were impacted, such as access to direct patient observation, and maintaining an environment conducive to testing. As such, external factors have the potential to affect the validity of data collected.    TESTS ADMINISTERED:  CNS Vital Signs Neurocognitive Battery  Huy Adult ADHD Rating Scale-IV: Self and Other Reports (BAARS-IV)  Huy Functional Impairment Scale: Self and Other Reports (BFIS)  Huy Deficits in Executive Functioning Scale (BDEFS)  Generalized Anxiety Disorder Questionnaire (RIANNA-7)  Patient Health Questionnaire- 9 (PHQ-9)    BEHAVIORAL OBSERVATIONS: The patient was pleasant and cooperative throughout all interview and explanation of testing process. The patient was oriented to person, place, and time. Mood was neutral. Eye contact was adequate and speech was at normal rate and rhythm. Motor activity was appropriate. Due to virtual/remote administration, direct patient observation was not possible during the testing process, and it is unknown if the patient was able to maintain an environment conducive to testing. As such, external factors have the potential to affect the validity of data collected.     TEST RESULTS: Test results comprise a hypothesis of the patient's mental health concerns and are not an independent or conclusive assessment. Test results are combined with the patient's available medical, psychological, behavioral, and observational data for an integrated interpretation and report.    CNS Vital Signs Neurocognitive Battery  The CNS Vital Signs Neurocognitive Battery is a remotely-administered assessment comprised of seven core subtests to  individually measure the patient's verbal memory, visual memory, motor speed, psychomotor speed, reaction time, focus, ability to sustain attention and ability to adapt to changing rules and tasks.      Above average domain scores indicate a standard score (SS) greater than 109 or a Percentile Rank (SC) greater than 74, indicating a high functioning test subject. Average is a SS  or SC 25-74, indicating normal function. Low Average is a SS 80-89 or SC 9-24 indicating a slight deficit or impairment. Below Average is a SS 70-79 or SC 2-8, indicating a moderate level of deficit or impairment. Very Low is a SS less than 70 or a SC less than 2, indicating a deficit and impairment.  Validity Indicator denotes a guideline for representing the possibility of an invalid test or domain score, and can be influenced by patient understanding, effort, or other conditions.    The patient's results are detailed below:    Domain Standard Score Percentile Description Validity   Neurocognitive Index 101 53 Average Yes   Composite Memory Measure 122 93 Above Average Yes   Verbal Memory 118 88 Above Average Yes   Visual Memory 118 88 Above Average Yes   Psychomotor Speed 109 73 Average Yes   Reaction Time 89 23 Low Average Yes   Complex Attention 101 53 Average Yes   Cognitive Flexibility 86 18 Low Average Yes   Processing Speed 84 14 Low Average Yes   Executive Function 85 16 Low Average Yes   Reasoning 91 27 Average Yes   Working Memory 98 45 Average Yes   Sustained Attention  101 53 Average Yes   Simple Attention 108 70 Average Yes   Motor Speed 124 95 Above Average Yes     Neurocognitive Index (NCI): Measures an average score derived from the domain scores or a general assessment of the overall neurocognitive status of the patient. The patient's NCI score is 101, with a percentile of 53, and falls within the average range.    Composite Memory: Measures how well subject can recognize, remember, and retrieve words and geometric  figures, and is comprised of the Visual and Verbal Memory domains. The patient's Composite Memory score is 122, with a percentile of 93, and falls within the above average range.    Verbal Memory: Measures how well subject can recognize, remember, and retrieve words. The patient's Verbal Memory score is 118, with a percentile of 88, and falls within the above average range.    Visual Memory: Measures how well subject can recognize, remember and retrieve geometric figures. The patient's Visual Memory score is 118, with a percentile of 88, and falls within the above average range.    Psychomotor Speed: Measures how well a subject perceives, attends, responds to complex visual-perceptual information and performs simple fine motor coordination, and is comprised of the Motor Speed and Processing Speed indexes. The patient's Psychomotor Speed score is 109, with a percentile of 73, and falls within the average range.    Reaction Time: Measures how quickly the subject can react, in milliseconds, to a simple and increasingly complex direction set. The patient's Reaction Time score is 89, with a percentile of 23, and falls within the low average range.    Complex Attention: Measures the ability to track and respond to a variety of stimuli over lengthy periods of time and/or perform complex mental tasks requiring vigilance quickly and accurately. The patient's Complex Attention score is 101, with a percentile of 53, and falls within the average range.    Cognitive Flexibility: Measures how well subject is able to adapt to rapidly changing and increasingly complex set of directions and/or to manipulate the information. The patient's Cognitive Flexibility score is 86, with a percentile of 18, and falls within the low average range.    Processing Speed: Measures how well a subject recognizes and processes information i.e., perceiving, attending/responding to incoming information, motor speed, fine motor coordination, and  visual-perceptual ability. The patient's Processing Speed score is 84, with a percentile of 14, and falls within the low average range.    Executive Function: Measures how well a subject recognizes rules, categories, and manages or navigates rapid decision making. The patient's Executive Function score is 85, with a percentile of 16, and falls within the low average range.    Reasoning: Measures how well a subject can perceive and understand the meaning of visual or abstract information and recognizing relationships between visual-abstract concepts. The patient's Reasoning score is 91, with a percentile of 27, and falls in the average range.     Working Memory: Measures how well a subject can perceive and attend to symbols using short-term memory processes. Also measures the ability to carry out short-term memory tasks that support decision making, problem solving, planning, and execution. The patient's Working Memory score is 98, with a percentile of 45, and falls in the average range.    Sustained Attention: Measures how well a subject can direct and focus cognitive activity on specific stimuli. Also measurs how well a subject can focus and complete task or activity, sequence action, and focus during complex thought. The patient's Sustained Attention score is 101, with a percentile of 53, and falls in the average range.    Simple Attention: Measures the ability to track and respond to a single defined stimulus over lengthy periods of time while performing vigilance and response inhibition quickly and accurately to a simple task. The patient's Simple Attention score is 108, with a percentile of 70, and falls within the average range.    Motor Speed: Measure: Ability to perform simple movements to produce and satisfy an intention towards a manual action and goal. The patient's Motor Speed score is 124, with a percentile of 95, and falls within the above average range.    Huy Adult ADHD Rating Scale-IV: Self and  "Other Reports (BAARS-IV)  The BAARS-IV assesses for symptoms of ADHD that are experienced in one's daily life. This assessment measure includes self and collateral rating scales designed to provide information regarding current and childhood symptoms of ADHD including inattention, hyperactivity, and impulsivity. Self-report scores are reported as percentiles. Scores at the 76th-83rd percentile are considered marginal, scores at the 84th-92nd percentile are considered borderline, scores at the 93rd-95th percentile are considered mild, scores at the 96th-98th percentile are considered moderate, and those at the 99th percentile are considered severe. Collateral or \"other\" rating scales are reported as number of symptoms observed in comparison to those reported by the client. Norms and percentile scores are not available for collateral reports.     Current Symptoms Scale--Self Report:   Client completed the self-report inventory of current symptoms. The results indicate that the client's Total ADHD Score was 45 which places the patient in the 96th percentile for overall ADHD symptoms. In addition, the client endorsed 8/9 (98th percentile) Inattention symptoms, 2/9 (88th percentile) Hyperactivity-Impulsivity symptoms, and 7/9 (97th percentile) Sluggish Cognitive Tempo symptoms. Client indicated that the reported symptoms have resulted in impaired functioning in school, home, work, and social relationships. Overall, the results suggest the client is experiencing moderate ADHD symptoms.     Current Symptoms Scale--Other Report:  Client's  completed the collateral report inventory of current symptoms. Based on the collateral contact's observation of symptoms, the client demonstrates 6/9 Inattention symptoms, 0/5 Hyperactivity symptoms, 0/4 Impulsivity symptoms, and 7/9 Sluggish Cognitive Tempo symptoms. The client's Total ADHD Score was 39. The collateral contact indicated the client demonstrates impaired functioning " "at home, work, and social relationships.  The collateral- and self-report scores are not significantly different.    Childhood Symptoms Scale--Self-Report:  Client completed the self-report inventory of childhood symptoms. The results indicate that the client's Total ADHD Score was 47 which places the patient in the 94th percentile for overall ADHD symptoms in childhood. In addition, the client endorsed 6/9 (93rd percentile) Inattention symptoms and 4/9 (89th percentile) Hyperactivity-Impulsivity symptoms. Client indicated that the reported symptoms resulted in impaired functioning in school, home, and social relationships. Overall, the results suggest the client experienced mild symptoms of ADHD as a child.     Childhood Symptoms Scale--Other Report:  Client's parents completed the collateral report inventory of childhood symptoms. Based on the collateral contact's recollection of client's childhood symptoms, the client demonstrated 0/9 Inattention symptoms and 0/9 Hyperactivity-Impulsivity symptoms. The client's Total ADHD Score was 22. The collateral contact indicated the client demonstrates impaired functioning in no areas.  The collateral- and self-report scores are significantly different.                           Huy Functional Impairment Scale: Self and Other Reports (BFIS)  The BFIS is used to assess an individuals' psychosocial impairment in major life/daily activities that may be due to a mental health disorder. This assessment measure includes self and collateral rating scales. Self-report scores are reported as percentiles. Scores at the 76th-83rd percentile are considered marginal, scores at the 84th-92nd percentile are considered borderline, scores at the 93rd-95th percentile are considered mild, scores at the 96th-98th percentile are considered moderate, and those at the 99th percentile are considered severe. Collateral or \"other\" rating scales are reported as number of symptoms observed in " "comparison to those reported by the client. Norms and percentile scores are not available for collateral reports.     Results indicate the client identified impairment (scores at or greater than 93rd percentile) in the following areas: home-family, home-chores, marriage/cohabiting/dating, money management, daily responsibilities, and health maintenance.  The client's Mean Impairment Score was 5.4 (92nd percentile) indicating the client is reporting 43% impairment in functioning across domains. Client's  completed the collateral rating scale, which indicated similar results.      Huy Deficits in Executive Functioning Scale (BDEFS)  The BDEFS is a measure used for evaluating dimensions of adult executive functioning in daily life. This assessment measure includes self and collateral rating scales. Self-report scores are reported as percentiles. Scores at the 76th-83rd percentile are considered marginal, scores at the 84th-92nd percentile are considered borderline, scores at the 93rd-95th percentile are considered mild, scores at the 96th-98th percentile are considered moderate, and those at the 99th percentile are considered severe. Collateral or \"other\" rating scales are reported as number of symptoms observed in comparison to those reported by the client. Norms and percentile scores are not available for collateral reports.     Results indicate the client's Total Executive Functioning Score was 249 (99+ percentile). The ADHD-Executive Functioning Index score was 31 (98th percentile). These scores suggest the client has moderate deficits in executive functioning. Results indicate the client identified significant deficits in the following areas: self-management to time (severe deficits), self-organization/problem-solving (severe deficits), self-restraint (mild deficits), self-motivation (moderate deficits) and self-regulation of emotions (no significant deficits). Client's  completed the collateral " "rating scale, which indicated similar results. The collateral contact's scores were generally lower than the client's report.    Generalized Anxiety Disorder Questionnaire (RIANNA-7)  This questionnaire is designed to assess for anxiety in adults. Based on the score, the patient is experiencing mild symptoms of anxiety. Client identified the following symptoms of anxiety: feeling on edge/nervous/anxious, difficulty controlling worry, worrying about many different things, trouble relaxing, and becoming easily annoyed or irritable.    Patient Health Questionnaire- 9 (PHQ-9)   This questionnaire is designed to assess for depression in adults. Based on the score, the patient is experiencing mild symptoms of depression. Client identified the following symptoms of depression: lack of interest, difficulty with sleep, feeling tired or having little energy, feeling bad about self, and poor concentration.    SUMMARY: Eleanor Hodges is a 29-year-old [place] woman who completed psychological testing remotely/virtually. Testing was requested to provide updated diagnostic clarification and necessary treatment recommendations.    Patient first completed a diagnostic interview in which mental health symptoms, ADHD symptoms, and background information was gathered. Patient self-reported six symptoms of inattention and indicated that her abilities to function effectively at home and work are significantly impaired. Further, her self-reported symptoms on Huy measures of ADHD symptoms were consistent with this information.  The patient recalled a history of significant symptoms in childhood while her parents did not.  The patient described her parents as \"old school,\" stating that they make comments such as \"oh no, you were the best!\" when completing the collateral reports.    An objective measure of neurocognitive functioning was also administered.  Results first indicated verbal memory to be scored in the overall above " average range.  The patient was able to recognize target words from a word list immediately and after a delay more effectively than peers.  Visual memory scored to be in the overall above average range.  She was able to recognize target shapes immediately in the average range and in the above average range after a delay.  Reasoning scored to be in the average range, as the patient was able to solve nonverbal puzzle matrices comparable to peers.  Processing speed scored to be in the low average range, as the patient was able to scan and respond to visual stimuli slower than peers.  On the Stroop test, the patient was able to inhibit the salient, but incorrect, response comparable to peers.  However, she did respond slower than peers on portions throughout this test.  On a test of shifting attention, the patient was able to adjust her responses according to changing rules sets comparable to peers.  Again, she did respond to stimuli slower than peers.  As a result, cognitive flexibility and executive functioning were scored to be in the low average range.  On a test of continuous performance, the patient was able to hola her attention and resist making impulsive mistakes comparable to peers; simple attention scored to be in the average range.  On CNS Vital signs, ADHD is associated with significant deficits in attention, processing speed, and executive functioning capabilities.  Consistent with childhood history of symptoms, problems can be conceptualized as having a neurodevelopmental basis.  See recommendations below.    Referral Question Response: DSM-5 criteria for ADHD:   A. Symptom Count - Are there sufficient symptoms for the diagnosis? Yes, patient did endorse sufficient inattention symptoms.   B. Onset - Were several symptoms present before 12 years of age? Yes, a significant number of symptoms reportedly began in elementary school.   C. Pervasiveness - Are several symptoms present in at least two settings?  Yes, patient reported that symptoms are problematic at home and work.   D. Impairment - Do symptoms interfere with or reduce the quality of functioning? Yes, patient is unable to complete daily tasks effectively.   E. Exclusions - Are symptoms better explained by another disorder or factor? No, symptoms are not better explained by anxiety symptoms. Difficulties are explained by an organic basis of inattention.     The patient meets the following DSM-5 criteria for generalized anxiety disorder:  A. Excessive anxiety and worry, occurring more days than not for at least 6 months about a number of events or activities.   B. The individual finds it difficult to control the worry.  C. The anxiety and worry are associated with 3 or more of 6 symptoms.  D. The anxiety, worry, or physical symptoms cause clinically significant distress or impairment in social, occupational, or other important areas of functioning.  E. The disturbance is not attributable to the physiological effects of a substance (e.g., a drug of abuse, a medication) or another medical condition (e.g., hyperthyroidism).  F. The disturbance is not better explained by another mental disorder.    DIAGNOSES:  F90.0 Attention-Deficit/Hyperactivity Disorder, inattentive presentation, moderate  F41.1 Generalized Anxiety Disorder    PLAN OF CARE:  Discuss the following with your primary care provider:  Consider a trial of a stimulant medication. This may help alleviate some of the patient's attentional symptoms.   Consider a trial of a psychotropic medication. This may help alleviate some of the patient's depression and anxiety symptoms.     Continue individual psychotherapy.     RECOMMENDATIONS:  Due to the patient's reported attention, concentration, and mood difficulties, the following health/lifestyle changes when combined, can significantly improve symptoms:   Avoid simple carbohydrates at breakfast. Aim for only complex carbohydrates and lean protein for your  morning meal.   Engage in aerobic exercise 3 times per week for 30 minutes, ensuring that your heart rate stays within your training zone. Further, reading the book,  Spark,  by Franc Gonzalez M.D. can help the patient understand the benefits of exercise on the brain.   Research suggest that taking a high-quality multi-vitamin and antioxidant (1/2 cup of blueberries) daily in conjunction with balanced nutrition can be helpful.  Aim for the high end of daily water intake: around 72 ounces per day.  Ensure regular meals and snacks to maintain optimal attention.    The following may be beneficial in managing some of the patient's attention and concentration difficulties:  Due to the patient's difficulties with attention and concentration, consider working in a completely distraction-free area while completing tasks. Workspaces should be completely clear except for the materials needed for the current task. Both visual and auditory distractions should be decreased as much as possible.  Considering decreased ability to focus and maintain attention, it is recommended that the patient take frequent breaks while completing tasks. This will help to maintain attention and effort. The patient may benefit from the use of a Webtalk Timer. The timer works by using built-in break times. After working on a task consistently for 25 minutes, the timer reminds the user to take a five-minute break before continuing, etc. A Webtalk timer can be downloaded as a free dasha to a phone or tablet.  Due to the patient's attentional and concentration symptoms, it is recommended to increase organization with the use of lists and calendars. Significantly increasing structure to the day and adhering to a set schedule can increase your ability to complete responsibilities, track deadline, etc. Breaking these tasks down into their component parts and recording them in a calendar/planner will likely be beneficial. Patient would benefit from setting  feasible timelines for completion of activities. By establishing clear priorities for completing tasks, you can more likely complete the most important tasks first. The patient may also choose to elect to a friend or family member to help hold them accountable.    Avoid multitasking. Attempting to work on multiple tasks and projects the same increases the likelihood that an error will occur. Focus on one task at a time.    The patient may benefit from engaging in mindfulness practices. This may include breathing techniques, apps that provide guided meditation, or more interactive activities such as coloring.    See the attached tip sheet for more ideas as well as online and book resources.       Dilma Mcallister PsyD, LP  Clinical Psychologist

## 2025-03-12 NOTE — PATIENT INSTRUCTIONS
Coping with Attention-Deficit/Hyperactivity Disorder (ADHD)    If you have ADHD, it can be hard to sit still, pay attention or control impulsive behavior. ADHD can cause problems at home, at school, at work and in social settings. But you can learn ways to control your symptoms. Below are some ideas for coping with the most common ADHD problems.     Memory, Focus, and Managing Time  Be mindful of time. Use a watch, phone, timer, alarm, PDA or computer--anything that keeps accurate time that you can see and hear at all times. Set more than one alarm to remind you how much time you have left for a task. Give yourself more time than you think you need. For important appointments or deadlines, set reminder alarms hours or days ahead of time.   Use a day planner. A day planner can help you manage time and remember responsibilities. Learning to use a planner is just like learning to use any tool--practice makes perfect.   Use lists. Lists and notes can help you keep track of regular tasks, projects, deadlines and appointments. If you decide to use a daily planner, keep all lists and notes inside of it. Use color codes for tasks so you know which ones are the most important.  Learn to say no and set boundaries. Do not take on too many projects or social engagements. Overbooking yourself can be overwhelming and can lead to missed commitments.  Repeat out loud the instructions you have been given. This will help you remember, as well as let others correct you if needed.    Organization  Create space. Ask yourself what you need on a daily basis. Then, find storage bins or closets for things you don t need every day. Have specific areas for things like keys, bills and other items that are easy to misplace. Throw away or donate things you don t need.   Deal with it now. You can avoid forgetfulness, clutter and procrastination by doing things right away, not some time in the future. This includes filing papers, cleaning up  messes, opening and responding to mail and returning phone calls.  Set up a filing system. Use dividers or separate file folders for different types of documents, such as medical records, receipts and pay stubs. Label and color-code your files so that you can quickly find what you need.   Break larger tasks into small, manageable pieces. Write down the steps needed to complete the task. Follow each step in order until the task is done. If needed, take small, timed breaks and return to finish the task.  Organize your work space. Use lists or planners to organize your day. Remove clutter from your desk. Label any storage bins. Reduce distraction as much as possible. Ideas include sitting facing the wall, closing your door or using a white noise machine.    Sitting Still  Move around as needed. Don t fight the urge to move around. If you need to fidget or stand up for a period of time to help maintain attention, then do so. But take care that you re not interrupting others. You may also find it helpful to squeeze a stress ball.  Be active in a useful way. Exercise can burn off extra energy, relieve stress, boost your mood and calm your mind. Meditation, yoga or rojelio chi can help you better control your attention and impulses.  Stay healthy. Get enough sleep. Avoid caffeine late in the day. Exercise. Create a relaxing bedtime routine. Stick to a schedule or daily routine. Eat small meals throughout the day. Avoid sugar, eat fewer carbohydrates and eat more protein.     Close Relationships  Talk to your loved ones about ADHD. There are many resources available that can help your loved one understand ADHD. See the Resources section below.  Divide daily tasks based on each person s strengths. For example, if you have trouble with organization, you may not want to do financial planning tasks.  If you have trouble with focus, develop a sign that others in your life can use as a gentle reminder to pay attention. The sign  should be small but meaningful to you and the other person.  Improve communication. Use a dry erase board in a common area to help the whole family stay in touch better. Keep regular to-do lists and compare scheduled activities every day. Writing notes to each other is also very helpful.  Be an active listener and try not to interrupt. If you find your mind wandering when others are talking, mentally repeat their words so you can follow the conversation. Ask questions and ask people to repeat what they said if needed. Pay close attention to body language.   Organize your thoughts. If you need to have a serious conversation, write a list of the points you would like to make or the important ideas you want to talk about. This can help you stay focused and remember what you need to say.  Think before speaking. It can be hard to control your impulses when you feel strongly about something. Before saying whatever pops into your head, stop to ask yourself  Will this be helpful?  or  Will this help me get what I want?   Take charge of your life. Work to accept that some things are harder for you. Make a plan to address these troubles and seek the support you need.    Online Resources  ADD Warehouse. http://www.addGripeOhouse.com  Attention Deficit Disorder Association. http://www.add.org  Children and Adults with Attention-Deficit/Hyperactivity Disorder (PAMELLA). http://www.pamella.org/  Lena Marmolejo.  33 Ways to Get Organized with Adult ADHD.  http://www.additudemag.com/adhd/article/729.html  National Resource Center on ADHD. http://www.bhnf4gqhv.org/  Nereida Siegel.  Daily Living Tips for Adult ADHD.  http://www.medicinenet.com/living_tips_adult_adhd_pictures_slideshow/article.htm  Andi Calero and Joselyn Senior.  Help for Adult ADD / ADHD.  http://www.helpguide.org  You can also find many apps for your phone that can help you with common ADHD struggles    Book Resources  Gavin Son. ADHD in Adults.  (2008).  Gavin Son. Taking Charge of Adult ADHD. (2010).  Roberto Goode and Franc Gonzalez. Delivered from Distraction. (2006).  Roberto Goode and Franc Gonzalez. Driven to Distraction. (2011).  Summer Spencer. ADD in the Workplace. (1997).  Summer Spencer. ADD-Friendly Ways to Organize Your Life. (2002).  Adrienne Moreira. The ADHD Effect on Marriage: Understand and Rebuild Your Relationship in Six Steps. (2010).  Julissa Randall and Mireya Perez. You Mean I m Not Lazy, Stupid or Crazy? (2006).  Irineo Muse. Understand Your Brain, Get More Done: The ADHD Executive Functions Workbook. (2012).  Everton Nguyễn. ADHD Is Awesome. (2024).    Support Groups  ADHD Adult Support Group  [address].  7:00 to 8:30 p.m., last Thursday of each month (except December).  Contact/RSVP: [email or web address]    ADHD Adult Support Group  [facility], [address] [place] [address].  Thursday 10:00 a.m. to 12:00 p.m. or 7:00 to 9:30 p.m.  Contact/RSVP: Magdi Davis. [phone] or [email or web address]     ADHD Adult Support Group for Spouses/Partners of adults with ADHD  [facility], [address] [place] [address].  Tuesday 12:00 to 2:00 p.m.   Contact/RSVP: Magdi Davis. [phone] or [email or web address]

## 2025-03-12 NOTE — PROGRESS NOTES
[facility]   Mental Health & Addiction Services     Progress Note - ADHD Feedback Session     Patient Name: Eleanor Hodges  Date: 2025       Service Type:  Individual       Session Start Time: 4:00pm  Session End Time:  4:32pm     Session Length: 32 minutes    Session #: 3    Attendees: Patient attended alone    Service Modality: Video Visit:      Provider verified identity through the following two step process.  Patient provided:  Patient  and Patient address    Telemedicine Visit: The patient's condition can be safely assessed and treated via synchronous audio and visual telemedicine encounter.      Reason for Telemedicine Visit: Patient convenience (e.g. access to timely appointments / distance to available provider)    Originating Site (Patient Location): Patient's home    Distant Site (Provider Location): Provider Remote Setting- Home Office    Consent:  The patient/guardian has verbally consented to: the potential risks and benefits of telemedicine (video visit) versus in person care; bill my insurance or make self-payment for services provided; and responsibility for payment of non-covered services.     Patient would like the video invitation sent by:  Send to e-mail at: [email or web address]    Mode of Communication:  Video Conference via [employer]    Distant Location (Provider):  Off-site    As the provider I attest to compliance with applicable laws and regulations related to telemedicine.          2025    10:16 AM   PHQ   PHQ-9 Total Score 5    Q9: Thoughts of better off dead/self-harm past 2 weeks Not at all       Patient-reported           2025    10:16 AM 2025     4:57 PM   RIANNA-7 SCORE   Total Score  8 (mild anxiety)   Total Score 7 8        Patient-reported         DATA      Progress Since Last Session (Related to Symptoms / Goals / Homework):   Symptoms: Stable.    Homework:  Completed.      Treatment Objective(s) Addressed in This Session:   Provided feedback on ADHD evaluation. Reviewed test results in depth. Plan of care and recommendations were discussed based on testing data. See full report attached on secondary note in this encounter.     Intervention:   Provided feedback to patient regarding testing results, diagnoses, and treatment recommendations. Test results are consistent with an ADHD diagnosis. Symptoms are not better explained by an anxiety disorder. Personalized suggestions regarding symptoms were offered. Patient had the opportunity to ask questions; she expressed understanding.        ASSESSMENT: Current Emotional / Mental Status (status of significant symptoms):   Risk status (Self / Other harm or suicidal ideation)   Patient denies current fears or concerns for personal safety.   Patient denies current or recent suicidal ideation or behaviors.   Patient denies current or recent homicidal ideation or behaviors.   Patient denies current or recent self injurious behavior or ideation.   Patient denies other safety concerns.   Patient reports there has been no change in risk factors since their last session.     Patient reports there has been no change in protective factors since their last session.     Recommended that patient call 911 or go to the local ED should there be a change in any of these risk factors     Appearance:   Appropriate    Eye Contact:   Good    Psychomotor Behavior: Normal    Attitude:   Cooperative    Orientation:   All   Speech    Rate / Production: Normal     Volume:  Normal    Mood:    Normal   Affect:    Appropriate    Thought Content:  Clear    Thought Form:  Coherent  Logical    Insight:    Good      Medication Review:   No current psychiatric medications prescribed     Medication Compliance:   NA     Changes in Health Issues:   None reported     Chemical Use Review:   Substance Use: See report.      Nicotine Use: No current tobacco use.       Diagnosis:  1. Attention deficit hyperactivity disorder, inattentive type, moderate    2. Generalized anxiety disorder        PLAN:   Recommendations are outlined in full evaluation report (attached to this encounter).   Patient indicated understanding and will contact the clinic if there are further questions.    Report routed to referring provider.    Parts of this documentation may have been completed using dictation software. Potential errors may result and are unintentional.       Dilma Mcallister PsyD, LP  Clinical Psychologist         Psychological Testing Services Summary       Testing Evaluation Services Base: 57613  (first 60 mins) Add-on: 75407  (each addtl 60 mins)   Record Review and Clarify Referral Question   10:50am-11:00am on 2/12/2025  10 minutes   Clinical Decision Making/Battery Modification   4:45pm-5:00pm on 2/19/2025 15 minutes   Integration/Report Generation   5:30pm-6:30pm on 3/11/2025 ([facility])  2:15pm-2:45pm on 3/11/2025 ([facility])  6:30pm-7:30pm on 3/11/2025 (Final Report)   60 minutes  30 minutes  60 minutes   Interactive Feedback Session   4:00pm-4:32pm on 3/12/2025 32 minutes   Post-Service Work   4:32pm-4:47pm on 3/12/2025 15 minutes   Total Time: 222 minutes   Total Units: 1 3       Diagnoses:   1. Attention deficit hyperactivity disorder, inattentive type, moderate    2. Generalized anxiety disorder

## 2025-03-13 ENCOUNTER — VIRTUAL VISIT (OUTPATIENT)
Dept: FAMILY MEDICINE | Facility: CLINIC | Age: 30
End: 2025-03-13
Payer: COMMERCIAL

## 2025-03-13 DIAGNOSIS — K21.9 GASTROESOPHAGEAL REFLUX DISEASE WITHOUT ESOPHAGITIS: ICD-10-CM

## 2025-03-13 DIAGNOSIS — F90.2 ADHD (ATTENTION DEFICIT HYPERACTIVITY DISORDER), COMBINED TYPE: Primary | ICD-10-CM

## 2025-03-13 PROCEDURE — 98006 SYNCH AUDIO-VIDEO EST MOD 30: CPT | Performed by: INTERNAL MEDICINE

## 2025-03-13 RX ORDER — DEXTROAMPHETAMINE SACCHARATE, AMPHETAMINE ASPARTATE, DEXTROAMPHETAMINE SULFATE AND AMPHETAMINE SULFATE 2.5; 2.5; 2.5; 2.5 MG/1; MG/1; MG/1; MG/1
10 TABLET ORAL DAILY
Qty: 30 TABLET | Refills: 0 | Status: SHIPPED | OUTPATIENT
Start: 2025-03-13

## 2025-03-13 RX ORDER — OMEPRAZOLE 10 MG/1
20 CAPSULE, DELAYED RELEASE ORAL DAILY
COMMUNITY
Start: 2025-02-25

## 2025-03-13 NOTE — PROGRESS NOTES
Eleanor is a 29 year old who is being evaluated via a billable video visit.    How would you like to obtain your AVS? MyChart  If the video visit is dropped, the invitation should be resent by: Text to cell phone: [phone]  Will anyone else be joining your video visit? No      Assessment & Plan     ADHD (attention deficit hyperactivity disorder), combined type  She was recently diagnosed with ADHD.   Patient has a signed Controlled Substance Agreement (non-opioid). Patient is aware of potential adverse effects of stimulants including cardiovascular compromise, palpitations, sleep disturbance, weight loss, worsening or other psychiatric conditions. Patient denies experiencing any side effects at this time and benefits currently outweigh risks as patient finds Rx therapeutic. Return in 3-6 months for follow-up for continued management of ADHD and for physical in office.   - amphetamine-dextroamphetamine (ADDERALL) 10 MG tablet; Take 1 tablet (10 mg) by mouth daily.    Gastroesophageal reflux disease without esophagitis  Acid reflux despite 24 day PPI therapy.   Will check for h.pylori.  Patient requesting referral to GI for possible endoscopy.   Discussed to continue PPI   - Helicobacter pylori Antigen Stool; Future  - Adult GI  Referral - Consult Only; Future      See Patient Instructions    Subjective   Eleanor is a 29 year old, presenting for the following health issues:  Recheck Medication      3/13/2025     9:12 AM   Additional Questions   Roomed by Trish     History of Present Illness       Reason for visit:  I got diagnosed with ADHD    She eats 0-1 servings of fruits and vegetables daily.She consumes 0 sweetened beverage(s) daily.She exercises with enough effort to increase her heart rate 9 or less minutes per day.  She exercises with enough effort to increase her heart rate 3 or less days per week. She is missing 3 dose(s) of medications per week.  She is not taking prescribed medications  regularly due to side effects.        Eleanor Hodges is here virtually.         GERD:        Objective           Vitals:  No vitals were obtained today due to virtual visit.    Physical Exam   GEN: No acute distress  RESP: No audible increased work of breathing. Patient speaking in full sentences without distress.  PSYCH: pleasant  Exam otherwise limited due to virtual platform        Video-Visit Details    Type of service:  Video Visit   Originating Location (pt. Location): Home    Distant Location (provider location):  On-site  Platform used for Video Visit: [facility]  Signed Electronically by: Bernie Torres MD

## 2025-03-13 NOTE — LETTER
[facility]  03/13/25  Patient: Eleanor Hodges  YOB: 1995  Medical Record Number: [id number]                                                                                  Non-Opioid Controlled Substance Agreement    This is an agreement between you and your provider regarding safe and appropriate use of controlled substances prescribed by your care team. Controlled substances are?medicines that can cause physical and mental dependence (abuse).     There are strict laws about having and using these medicines. We here at [facility] are  committed to working with you in your efforts to get better. To support you in this work, we'll help you schedule regular office appointments for medicine refills. If we must cancel or change your appointment for any reason, we'll make sure you have enough medicine to last until your next appointment.     As a Provider, I will:   Listen carefully to your concerns while treating you with respect.   Recommend a treatment plan that I believe is in your best interest and may involve therapies other than medicine.    Talk with you often about the possible benefits and the risk of harm of any medicine that we prescribe for you.  Assess the safety of this medicine and check how well it works.    Provide a plan on how to taper (discontinue or go off) using this medicine if the decision is made to stop its use.      ::  As a Patient, I understand controlled substances:     Are prescribed by my care provider to help me function or work and manage my condition(s).?  Are strong medicines and can cause serious side effects.     Need to be taken exactly as prescribed.?Combining controlled substances with certain medicines or chemicals (such as illegal drugs, alcohol, sedatives, sleeping pills, and benzodiazepines) can be dangerous or even fatal.? If I stop taking my medicines suddenly, I may have severe withdrawal symptoms.     The risks,  benefits, and side effects of these medicine(s) were explained to me. I agree that:    I will take part in other treatments as advised by my care team. This may be psychiatry or counseling, physical therapy, behavioral therapy, group treatment or a referral to specialist.    I will keep all my appointments and understand this is part of the monitoring of controlled substances.?My care team may require an office visit for EVERY controlled substance refill. If I miss appointments or don t follow instructions, my care team may stop my medicine    I will take my medicines as prescribed. I will not change the dose or schedule unless my care team tells me to. There will be no refills if I run out early.      I may be asked to come to the clinic and complete a urine drug test or complete a pill count. If I don t give a urine sample or participate in a pill count, the care team may stop my medicine.    I will only receive controlled substance prescriptions from this clinic. If I am treated by another provider, I will tell them that I am taking controlled substances and that I have a treatment agreement with this provider. I will inform my [facility] care team within one business day if I am given a prescription for any controlled substance by another healthcare provider. My [facility] care team can contact other providers and pharmacists about my use of any medicines.    It is up to me to make sure that I don't run out of my medicines on weekends or holidays.?If my care team is willing to refill my prescription without a visit, I must request refills only during office hours. Refills may take up to 3 business days to process. I will use one pharmacy to fill all my controlled substance prescriptions. I will notify the clinic about any changes to my insurance or medicine availability.    I am responsible for my prescriptions. If the medicine/prescription is lost, stolen or destroyed, it will not be replaced.?I  also agree not to share controlled substance medicines with anyone.     I am aware I should not use any illegal or recreational drugs. I agree not to drink alcohol unless my care team says I can.     If I enroll in the [place] Medical Cannabis program, I will tell my care team before my next refill.    I will tell my care team right away if I become pregnant, have a new medical problem treated outside of my regular clinic, or have a change in my medicines.     I understand that this medicine can affect my thinking, judgment and reaction time.? Alcohol and drugs affect the brain and body, which can affect the safety of my driving. Being under the influence of alcohol or drugs can affect my decision-making, behaviors, personal safety and the safety of others. Driving while impaired (DWI) can occur if a person is driving, operating or in physical control of a car, motorcycle, boat, snowmobile, ATV, motorbike, off-road vehicle or any other motor vehicle ([place] Statute 169A.20). I understand the risk if I choose to drive or operate any vehicle or machinery.    I understand that if I do not follow any of the conditions above, my prescriptions or treatment may be stopped or changed.   I agree that my provider, clinic care team and pharmacy may work with any city, state or federal law enforcement agency that investigates the misuse, sale or other diversion of my controlled medicine. I will allow my provider to discuss my care with, or share a copy of, this agreement with any other treating provider, pharmacy or emergency room where I receive care.     I have read this agreement and have asked questions about anything I did not understand.    ________________________________________________________  Patient Signature - Eleanor Hodges     ___________________                   Date     ________________________________________________________  Provider Signature - Bernie Torres MD       ___________________                    Date     ________________________________________________________  Witness Signature (required if provider not present while patient signing)          ___________________                   Date

## 2025-03-16 ENCOUNTER — HEALTH MAINTENANCE LETTER (OUTPATIENT)
Age: 30
End: 2025-03-16

## 2025-04-03 ENCOUNTER — TRANSFERRED RECORDS (OUTPATIENT)
Dept: ADMINISTRATIVE | Facility: CLINIC | Age: 30
End: 2025-04-03
Payer: COMMERCIAL

## 2025-04-07 ENCOUNTER — TRANSFERRED RECORDS (OUTPATIENT)
Dept: MULTI SPECIALTY CLINIC | Facility: CLINIC | Age: 30
End: 2025-04-07
Payer: COMMERCIAL

## 2025-04-07 LAB — TSH SERPL-ACNC: 1.19 UIU/ML (ref 0.45–4.5)

## 2025-04-07 NOTE — PROCEDURES
[facility]   [address], [address], [place] [address]     Patient Name: Eleanor Hodges  Gender:  Female  Exam Date: 04/03/2025 Visit Number:  [id number]  Age: 29 Years YOB: 1995  Attending MD: Marco A Florentino DO Medical Record#:  [id number]  -----------------------------------------------------------------------------------------------------------------------------   Procedure:    Upper GI Endoscopy   Indications:    Heartburn   Abdominal Pain  Provider:        Marco A Florentino DO   Referring MD: Referral Self   Primary MD:      Bernie Torres MD  Medications:   Admitting Medication:   0.9% Normal Saline at [facility]   Intra Procedure Medications:   Patient received monitored anesthesia care.     Complications: No immediate complications  ___________________________________________________________________________________________  Procedure:   An examination of the heart and lungs was performed within acceptable limits.  . The patient was therefore deemed a reasonable candidate for sedation.   The risks and benefits were explained to the patient, who appeared to understand. After obtaining informed consent, the scope was passed under direct vision. Throughout the procedure the patient's blood pressure, pulse and oxygen saturations were monitored.  The scope was introduced through the mouth and advanced to the second portion of duodenum.         Findings:   Esophagus:    Normal esophagus.   The z-line is 36 centimeters from the incisors.  Top of the gastric folds is 36 centimeters from the incisors.  Stomach:    Normal stomach.    H. Pylori biopsies taken.   The diaphragm hiatus is at 36 centimeters from the incisors.  Duodenum:    Normal duodenum.    Celiac Sprue biopsies taken.  Celiac Sprue biopsies taken.  Impression:   Epigastric pain  Heartburn    Preliminary Plan:  Recommendation Comments:  Await biopsy results regarding further recommendations.  Once  those biopsy results become available, I will forward them to your referring gastroenterology provider Myrna Valencia NP.  You can also discuss the results of your procedure at your office visit with them on 4/7/25.  Pathology Results:  A: DUODENUM, BIOPSY:           1. Duodenal intraepithelial lymphocytosis characterized by:               a. Increased intraepithelial lymphocytes               b. Normal villous architecture (Marsh level 1)           2. See comment      B: STOMACH, BIOPSY:           1. Normal gastric antral and body mucosae           2. Negative for Helicobacter      COMMENTS  A. Duodenal intraepithelial lymphocytosis is an etiologically non-specific finding; potential causes include celiac disease (gluten sensitive enteropathy), Helicobacter gastritis, NSAID use, autoimmune disorders, bacterial overgrowth, Crohn's disease (particularly if focal enhanced gastritis is also present) and non-gluten protein intolerance. Helicobacter gastritis as a cause of this duodenal inflammation is excluded. Less than half of patients with this histologic finding are ultimately found to have celiac disease.    Useful avenues for further evaluation of possible celiac disease include:     a. Serum IgA level plus anti-TTG and/or anti-deamidated gliadin peptide     b. HLA genotyping (celiac disease is rare if DQ2 and DQ8 absent)     c. Family history (celiac disease more likely if family history of         celiac disease present)     d. A trial of gluten restriction (if symptomatic); symptoms such as         diarrhea, anemia may resolve even if serologies are negative)    If this is subsequently proven to be celiac disease the findings on this biopsy would be consistent with a Marsh 1 lesion.    Please contact us if you have questions ([facility] GI pathology Service, [facility] extension [phone]).      MICROSCOPIC  A: Performed   B: Performed     Electronically signed by: Parag Christine MD    Interpreted at [facility],  [address] [place] [id number]  Additional Comments:  Plan to discuss these results further at your office visit with Myrna Valencia NP.  It was a pleasure meeting you.  Again, thank you for the opportunity to participate in your healthcare and please contact our office should you have any questions or concerns ([phone] ext. [phone]).      _Electronically signed by:___________________  Marco A Florentino DO                 04/03/2025    cc: Bernie Torres MD

## 2025-04-08 ENCOUNTER — TRANSFERRED RECORDS (OUTPATIENT)
Dept: ADMINISTRATIVE | Facility: CLINIC | Age: 30
End: 2025-04-08
Payer: COMMERCIAL

## 2025-04-09 NOTE — PROCEDURES
2025        Eleanor Hodges   [address] [place] [address]      Eleanor Hodges,  :  1995    I am writing to let you know the results of the tests that were done the other day.   Thank you for allowing [facility] the opportunity to take part in your healthcare.  At [facility] we strive to provide each patient with the finest gastroenterology care available.  We hope your experience was pleasant and informative.    Thyroid test is normal. CBC is largely within normal limits.  TSH 2025 11:24   Description Result Units Flags Range   TSH 1.190 uIU/mL  0.450-4.500   Comments   Performed At: [facility], [facility] [address], [place], [id number]  Jack Hendricks MD, Phone: [phone]   CBC, Platelet, No Differential 2025 11:24   Description Result Units Flags Range   Hematocrit 38.5 %  34.0-46.6   Hemoglobin 12.0 g/dL  11.1-15.9   MCH 28.0 pg  26.6-33.0   MCHC 31.2 g/dL L 31.5-35.7   MCV 90 fL  79-97   Platelets 285 x10E3/uL  150-450   RBC 4.29 x10E6/uL  3.77-5.28   RDW 13.9 %  11.7-15.4   WBC 5.8 x10E3/uL  3.4-10.8   Comments   Performed At: [facility], [facility] [address], [place], [id number]  Jack Hendricks MD, Phone: [phone]         I hope you are feeling well.  Please call sooner if you have a problem, otherwise I'll see you as we had previously scheduled.        Thank you.    Electronically signed by:  Myrna Valencia CNP 2025 10:33 AM  Document generated by:  Myrna Valencia CNP  2025  If your provider ordered multiple tests; the results may not become available at the same time.  If multiple test results are received within 14 days of one another, you may receive a duplicate.  cc:  Bernie Torres MD

## 2025-06-11 DIAGNOSIS — F90.2 ADHD (ATTENTION DEFICIT HYPERACTIVITY DISORDER), COMBINED TYPE: ICD-10-CM

## 2025-06-11 NOTE — TELEPHONE ENCOUNTER
Patient called requesting Adderall refill.  She has scheduled a future appointment with PCP for follow-up.  Patient states medication is working well for her and is unaware of side effects.      Future Appointments 6/11/2025 - 12/8/2025        Date Visit Type Length Department Provider     7/7/2025 12:30 PM OFFICE VISIT 30 min [facility]/Bernie Corral MD    Location Instructions:     [facility] is in [address] of the [facility] at [address] This is just south of [facility] and the [employer] exit off of [address]. Free parking is available; access the lot by turning east from [address] onto [address]. Through the main entrance, the clinic is directly to the left.                   Routing refill request to provider for review/approval because:  Drug not on the [facility] refill protocol

## 2025-06-12 RX ORDER — DEXTROAMPHETAMINE SACCHARATE, AMPHETAMINE ASPARTATE, DEXTROAMPHETAMINE SULFATE AND AMPHETAMINE SULFATE 2.5; 2.5; 2.5; 2.5 MG/1; MG/1; MG/1; MG/1
10 TABLET ORAL DAILY
Qty: 30 TABLET | Refills: 0 | Status: SHIPPED | OUTPATIENT
Start: 2025-06-12

## 2025-07-07 ENCOUNTER — VIRTUAL VISIT (OUTPATIENT)
Dept: FAMILY MEDICINE | Facility: CLINIC | Age: 30
End: 2025-07-07
Payer: COMMERCIAL

## 2025-07-07 DIAGNOSIS — F41.9 ANXIETY: ICD-10-CM

## 2025-07-07 DIAGNOSIS — F90.2 ADHD (ATTENTION DEFICIT HYPERACTIVITY DISORDER), COMBINED TYPE: Primary | ICD-10-CM

## 2025-07-07 PROCEDURE — 98006 SYNCH AUDIO-VIDEO EST MOD 30: CPT | Performed by: INTERNAL MEDICINE

## 2025-07-07 RX ORDER — DEXTROAMPHETAMINE SACCHARATE, AMPHETAMINE ASPARTATE, DEXTROAMPHETAMINE SULFATE AND AMPHETAMINE SULFATE 1.25; 1.25; 1.25; 1.25 MG/1; MG/1; MG/1; MG/1
5 TABLET ORAL DAILY
Qty: 10 TABLET | Refills: 0 | Status: SHIPPED | OUTPATIENT
Start: 2025-07-07 | End: 2025-08-06

## 2025-07-07 NOTE — PROGRESS NOTES
Eleanor is a 29 year old who is being evaluated via a billable video visit.    How would you like to obtain your AVS? MyChart  If the video visit is dropped, the invitation should be resent by: Text to cell phone: [phone]  Will anyone else be joining your video visit? No      Assessment & Plan     ADHD (attention deficit hyperactivity disorder), combined type  Increased dose to 15 mg daily. She only uses this medication as needed.   - amphetamine-dextroamphetamine (ADDERALL) 5 MG tablet; Take 1 tablet (5 mg) by mouth daily.    Anxiety  Stable.     Follow-up  No follow-ups on file.    Subjective   Eleanor is a 29 year old, presenting for the following health issues:  Follow Up    HPI      Eleanor Paz Mainor Hodges is here for follow up  Testing for a . She has received extra time for the testing - for english as a second language.     She is requesting a letter to extend the time even more based on her diagnosis of ADHD.     She has some anxiety as well.     Reminded about labs ordered last year which will  in a month.         Objective       Vitals:  No vitals were obtained today due to virtual visit.    Physical Exam   GEN: No acute distress  RESP: No audible increased work of breathing. Patient speaking in full sentences without distress.  PSYCH: pleasant  Exam otherwise limited due to virtual platform        Video-Visit Details    Type of service:  Video Visit   Originating Location (pt. Location): Home    Distant Location (provider location):  On-site  Platform used for Video Visit: Dontae  Signed Electronically by: Bernie Torres MD

## 2025-07-07 NOTE — PROGRESS NOTES
"Eleanor is a 29 year old who is being evaluated via a billable video visit.    How would you like to obtain your AVS? MyChart  If the video visit is dropped, the invitation should be resent by: Send to e-mail at: [email or web address]  Will anyone else be joining your video visit? No  {If patient encounters technical issues they should call [phone] :155059}    {PROVIDER CHARTING PREFERENCE:980779}    Subjective   Eleanor is a 29 year old, presenting for the following health issues:  Follow Up  {(!) Visit Details have not yet been documented.  Please enter Visit Details and then use this list to pull in documentation. (Optional):580132}  History of Present Illness       Reason for visit:  ADHD Follow Up - Accommodation Letter and Medication    She eats 0-1 servings of fruits and vegetables daily.She consumes 0 sweetened beverage(s) daily.She exercises with enough effort to increase her heart rate 20 to 29 minutes per day.  She exercises with enough effort to increase her heart rate 5 days per week. She is missing 4 dose(s) of medications per week.  She is not taking prescribed medications regularly due to other.        {SUPERLIST (Optional):546951}  {additonal problems for provider to add (Optional):122277}    {ROS Picklists (Optional):421663}      Objective           Vitals:  No vitals were obtained today due to virtual visit.    Physical Exam   {video visit exam brief selected:436440}    {Diagnostic Test Results (Optional):645008}      Video-Visit Details    Type of service:  Video Visit   Originating Location (pt. Location): {video visit patient location:359975::\"Home\"}  {PROVIDER LOCATION On-site should be selected for visits conducted from your clinic location or adjoining [facility] hospital, academic office, or other nearby [facility] building. Off-site should be selected for all other provider locations, including home:[address]}  Distant Location (provider location):  {virtual location provider:649520}  Platform used " "for Video Visit: {Virtual Visit Platforms:066074::\"Dontae\"}  Signed Electronically by: Bernie Torres MD  {Email feedback regarding this note to [email or web address]   :179807}  "

## 2025-08-20 ENCOUNTER — MYC REFILL (OUTPATIENT)
Dept: FAMILY MEDICINE | Facility: CLINIC | Age: 30
End: 2025-08-20
Payer: COMMERCIAL

## 2025-08-20 DIAGNOSIS — F90.2 ADHD (ATTENTION DEFICIT HYPERACTIVITY DISORDER), COMBINED TYPE: ICD-10-CM

## 2025-08-24 RX ORDER — DEXTROAMPHETAMINE SACCHARATE, AMPHETAMINE ASPARTATE, DEXTROAMPHETAMINE SULFATE AND AMPHETAMINE SULFATE 1.25; 1.25; 1.25; 1.25 MG/1; MG/1; MG/1; MG/1
5 TABLET ORAL DAILY
Qty: 30 TABLET | Refills: 0 | Status: SHIPPED | OUTPATIENT
Start: 2025-08-24